# Patient Record
Sex: FEMALE | Race: WHITE | Employment: UNEMPLOYED | RURAL
[De-identification: names, ages, dates, MRNs, and addresses within clinical notes are randomized per-mention and may not be internally consistent; named-entity substitution may affect disease eponyms.]

---

## 2017-02-24 ENCOUNTER — OFFICE VISIT (OUTPATIENT)
Dept: FAMILY MEDICINE CLINIC | Age: 27
End: 2017-02-24

## 2017-02-24 VITALS
SYSTOLIC BLOOD PRESSURE: 110 MMHG | WEIGHT: 120 LBS | TEMPERATURE: 98.4 F | OXYGEN SATURATION: 96 % | RESPIRATION RATE: 16 BRPM | HEIGHT: 66 IN | DIASTOLIC BLOOD PRESSURE: 75 MMHG | BODY MASS INDEX: 19.29 KG/M2 | HEART RATE: 105 BPM

## 2017-02-24 DIAGNOSIS — N94.10 DYSPAREUNIA IN FEMALE: ICD-10-CM

## 2017-02-24 DIAGNOSIS — R10.33 ABDOMINAL CRAMPING, PERIUMBILICAL: ICD-10-CM

## 2017-02-24 DIAGNOSIS — M54.50 ACUTE BILATERAL LOW BACK PAIN WITHOUT SCIATICA: Primary | ICD-10-CM

## 2017-02-24 DIAGNOSIS — M62.830 MUSCLE SPASM OF BACK: ICD-10-CM

## 2017-02-24 LAB
BILIRUB UR QL STRIP: NEGATIVE
GLUCOSE UR-MCNC: NEGATIVE MG/DL
HCG URINE, QL. (POC): NEGATIVE
KETONES P FAST UR STRIP-MCNC: NEGATIVE MG/DL
PH UR STRIP: 6 [PH] (ref 4.6–8)
PROT UR QL STRIP: NORMAL MG/DL
SP GR UR STRIP: 1.01 (ref 1–1.03)
UA UROBILINOGEN AMB POC: NORMAL (ref 0.2–1)
URINALYSIS CLARITY POC: CLEAR
URINALYSIS COLOR POC: YELLOW
URINE BLOOD POC: NORMAL
URINE LEUKOCYTES POC: NORMAL
URINE NITRITES POC: NEGATIVE
VALID INTERNAL CONTROL?: YES

## 2017-02-24 RX ORDER — CYCLOBENZAPRINE HCL 10 MG
10 TABLET ORAL
Qty: 5 TAB | Refills: 0 | Status: SHIPPED | OUTPATIENT
Start: 2017-02-24 | End: 2017-03-01

## 2017-02-24 NOTE — MR AVS SNAPSHOT
Visit Information Date & Time Provider Department Dept. Phone Encounter #  
 2/24/2017  8:00 AM Salú Palmer MD 4 [facility] [phone] [id number] Follow-up Instructions Return in about 2 weeks (around 3/10/2017) for routine follow up . Upcoming Health Maintenance Date Due DTaP/Tdap/Td series (1 - Tdap) 6/7/2010 PAP AKA CERVICAL CYTOLOGY 6/7/2010 INFLUENZA AGE 9 TO ADULT 8/1/2016 Allergies as of 2/24/2017  Review Complete On: 2/24/2017 By: Oneil Junior LPN Severity Noted Reaction Type Reactions Bees [Hymenoptera Allergenic Extract]  05/19/2014    Swelling Current Immunizations  Never Reviewed No immunizations on file. Not reviewed this visit You Were Diagnosed With   
  
 Codes Comments Acute bilateral low back pain without sciatica    -  Primary ICD-10-CM: M54.5 ICD-9-CM: 724.2, 338.19 Abdominal cramping, periumbilical     NUD-92-EZ: R10.33 ICD-9-CM: 789.05   
 Muscle spasm of back     ICD-10-CM: M44.816 ICD-9-CM: 724.8 Dyspareunia in female     ICD-10-CM: N94.10 ICD-9-CM: 625.0 Vitals BP  
  
  
  
  
  
 110/75 (BP 1 Location: Left arm, BP Patient Position: Sitting) Vitals History BMI and BSA Data Body Mass Index Body Surface Area  
 19.67 kg/m 2 1.59 m 2 Preferred Pharmacy Pharmacy Name Phone [facility] [address] [place] [address] [phone] Your Updated Medication List  
  
   
This list is accurate as of: 2/24/17  9:24 AM.  Always use your most recent med list.  
  
  
  
  
 biotin 2,500 mcg Tab Take  by mouth. cyclobenzaprine 10 mg tablet Commonly known as:  FLEXERIL Take 1 Tab by mouth nightly for 5 days. Indications: MUSCLE SPASM  
  
 norgestimate-ethinyl estradiol 0.25-35 mg-mcg Tab Commonly known as:  [facility] (28) Take 1 Tab by mouth daily. Prescriptions Sent to Pharmacy Refills  
 cyclobenzaprine (FLEXERIL) 10 mg tablet 0 Sig: Take 1 Tab by mouth nightly for 5 days. Indications: MUSCLE SPASM Class: Normal  
 Pharmacy: [address] #: [phone] Route: Oral  
  
We Performed the Following AMB POC URINE PREGNANCY TEST, VISUAL COLOR COMPARISON [42631 CPT(R)] UA/M W/RFLX CULTURE, ROUTINE [[id number] Custom] Follow-up Instructions Return in about 2 weeks (around 3/10/2017) for routine follow up . To-Do List   
 02/28/2017 Imaging:  US PELV NON OBS  LTD   
  
 02/28/2017 Imaging:  US TRANSVAGINAL Patient Instructions A Healthy Lifestyle: Care Instructions Your Care Instructions A healthy lifestyle can help you feel good, stay at a healthy weight, and have plenty of energy for both work and play. A healthy lifestyle is something you can share with your whole family. A healthy lifestyle also can lower your risk for serious health problems, such as high blood pressure, heart disease, and diabetes. You can follow a few steps listed below to improve your health and the health of your family. Follow-up care is a key part of your treatment and safety. Be sure to make and go to all appointments, and call your doctor if you are having problems. Its also a good idea to know your test results and keep a list of the medicines you take. How can you care for yourself at home? · Do not eat too much sugar, fat, or fast foods. You can still have dessert and treats now and then. The goal is moderation. · Start small to improve your eating habits. Pay attention to portion sizes, drink less juice and soda pop, and eat more fruits and vegetables. ¨ Eat a healthy amount of food. A 3-ounce serving of meat, for example, is about the size of a deck of cards. Fill the rest of your plate with vegetables and whole grains. ¨ Limit the amount of soda and sports drinks you have every day. Drink more water when you are thirsty. ¨ Eat at least 5 servings of fruits and vegetables every day. It may seem like a lot, but it is not hard to reach this goal. A serving or helping is 1 piece of fruit, 1 cup of vegetables, or 2 cups of leafy, raw vegetables. Have an apple or some carrot sticks as an afternoon snack instead of a candy bar. Try to have fruits and/or vegetables at every meal. 
· Make exercise part of your daily routine. You may want to start with simple activities, such as walking, bicycling, or slow swimming. Try to be active 30 to 60 minutes every day. You do not need to do all 30 to 60 minutes all at once. For example, you can exercise 3 times a day for 10 or 20 minutes. Moderate exercise is safe for most people, but it is always a good idea to talk to your doctor before starting an exercise program. 
· Keep moving. Solange Folk the lawn, work in the garden, or Music Kickup. Take the stairs instead of the elevator at work. · If you smoke, quit. People who smoke have an increased risk for heart attack, stroke, cancer, and other lung illnesses. Quitting is hard, but there are ways to boost your chance of quitting tobacco for good. ¨ Use nicotine gum, patches, or lozenges. ¨ Ask your doctor about stop-smoking programs and medicines. ¨ Keep trying. In addition to reducing your risk of diseases in the future, you will notice some benefits soon after you stop using tobacco. If you have shortness of breath or asthma symptoms, they will likely get better within a few weeks after you quit. · Limit how much alcohol you drink. Moderate amounts of alcohol (up to 2 drinks a day for men, 1 drink a day for women) are okay. But drinking too much can lead to liver problems, high blood pressure, and other health problems. Family health If you have a family, there are many things you can do together to improve your health. · Eat meals together as a family as often as possible. · Eat healthy foods. This includes fruits, vegetables, lean meats and dairy, and whole grains. · Include your family in your fitness plan. Most people think of activities such as jogging or tennis as the way to fitness, but there are many ways you and your family can be more active. Anything that makes you breathe hard and gets your heart pumping is exercise. Here are some tips: 
¨ Walk to do errands or to take your child to school or the bus. ¨ Go for a family bike ride after dinner instead of watching TV. Where can you learn more? Go to http://vanceSoliohiren.info/. Enter L062 in the search box to learn more about \"A Healthy Lifestyle: Care Instructions. \" Current as of: July 26, 2016 Content Version: 11.1 © 1950-1540 Fios. Care instructions adapted under license by [employer] (which disclaims liability or warranty for this information). If you have questions about a medical condition or this instruction, always ask your healthcare professional. [address] any warranty or liability for your use of this information. Low Back Pain: Exercises Your Care Instructions Here are some examples of typical rehabilitation exercises for your condition. Start each exercise slowly. Ease off the exercise if you start to have pain. Your doctor or physical therapist will tell you when you can start these exercises and which ones will work best for you. How to do the exercises Press-up 1. Lie on your stomach, supporting your body with your forearms. 2. Press your elbows down into the floor to raise your upper back. As you do this, relax your stomach muscles and allow your back to arch without using your back muscles. As your press up, do not let your hips or pelvis come off the floor. 3. Hold for 15 to 30 seconds, then relax. 4. Repeat 2 to 4 times. Alternate arm and leg (bird dog) exercise Note: Do this exercise slowly. Try to keep your body straight at all times, and do not let one hip drop lower than the other. 1. Start on the floor, on your hands and knees. 2. Tighten your belly muscles. 3. Raise one leg off the floor, and hold it straight out behind you. Be careful not to let your hip drop down, because that will twist your trunk. 4. Hold for about 6 seconds, then lower your leg and switch to the other leg. 5. Repeat 8 to 12 times on each leg. 6. Over time, work up to holding for 10 to 30 seconds each time. 7. If you feel stable and secure with your leg raised, try raising the opposite arm straight out in front of you at the same time. Knee-to-chest exercise 1. Lie on your back with your knees bent and your feet flat on the floor. 2. Bring one knee to your chest, keeping the other foot flat on the floor (or keeping the other leg straight, whichever feels better on your lower back). 3. Keep your lower back pressed to the floor. Hold for at least 15 to 30 seconds. 4. Relax, and lower the knee to the starting position. 5. Repeat with the other leg. Repeat 2 to 4 times with each leg. 6. To get more stretch, put your other leg flat on the floor while pulling your knee to your chest. 
Curl-ups 1. Lie on the floor on your back with your knees bent at a 90-degree angle. Your feet should be flat on the floor, about 12 inches from your buttocks. 2. Cross your arms over your chest. If this bothers your neck, try putting your hands behind your neck (not your head), with your elbows spread apart. 3. Slowly tighten your belly muscles and raise your shoulder blades off the floor. 4. Keep your head in line with your body, and do not press your chin to your chest. 
5. Hold this position for 1 or 2 seconds, then slowly lower yourself back down to the floor. 6. Repeat 8 to 12 times. Pelvic tilt exercise 1. Lie on your back with your knees bent. 2. \"Brace\" your stomach. This means to tighten your muscles by pulling in and imagining your belly button moving toward your spine. You should feel like your back is pressing to the floor and your hips and pelvis are rocking back. 3. Hold for about 6 seconds while you breathe smoothly. 4. Repeat 8 to 12 times. Heel dig bridging 1. Lie on your back with both knees bent and your ankles bent so that only your heels are digging into the floor. Your knees should be bent about 90 degrees. 2. Then push your heels into the floor, squeeze your buttocks, and lift your hips off the floor until your shoulders, hips, and knees are all in a straight line. 3. Hold for about 6 seconds as you continue to breathe normally, and then slowly lower your hips back down to the floor and rest for up to 10 seconds. 4. Do 8 to 12 repetitions. Hamstring stretch in doorway 1. Lie on your back in a doorway, with one leg through the open door. 2. Slide your leg up the wall to straighten your knee. You should feel a gentle stretch down the back of your leg. 3. Hold the stretch for at least 15 to 30 seconds. Do not arch your back, point your toes, or bend either knee. Keep one heel touching the floor and the other heel touching the wall. 4. Repeat with your other leg. 5. Do 2 to 4 times for each leg. Hip flexor stretch 1. Kneel on the floor with one knee bent and one leg behind you. Place your forward knee over your foot. Keep your other knee touching the floor. 2. Slowly push your hips forward until you feel a stretch in the upper thigh of your rear leg. 3. Hold the stretch for at least 15 to 30 seconds. Repeat with your other leg. 4. Do 2 to 4 times on each side. Wall sit 1. Stand with your back 10 to 12 inches away from a wall. 2. Lean into the wall until your back is flat against it. 3. Slowly slide down until your knees are slightly bent, pressing your lower back into the wall. 4. Hold for about 6 seconds, then slide back up the wall. 5. Repeat 8 to 12 times. Follow-up care is a key part of your treatment and safety. Be sure to make and go to all appointments, and call your doctor if you are having problems. It's also a good idea to know your test results and keep a list of the medicines you take. Where can you learn more? Go to http://vance-hiren.info/. Enter R202 in the search box to learn more about \"Low Back Pain: Exercises. \" Current as of: May 23, 2016 Content Version: 11.1 © 9925-0791 Mybandstock. Care instructions adapted under license by [employer] (which disclaims liability or warranty for this information). If you have questions about a medical condition or this instruction, always ask your healthcare professional. [address] any warranty or liability for your use of this information. Pelvic Ultrasound for Women: About This Test 
What is it? A pelvic ultrasound test uses sound waves to make a picture of the inside of the lower belly (pelvis). It allows your doctor to see your bladder, cervix, uterus, fallopian tubes, and ovaries. The sound waves create a picture on a video monitor. The test can be done in two ways: · Transabdominal. A small handheld device (transducer) is passed back and forth over your lower belly. · Transvaginal. A thin, lubricated transducer is placed in your vagina. Why is this test done? A pelvic ultrasound test is done to: · Find the cause of urinary problems. · Find out what's causing pelvic pain. · Look for causes of vaginal bleeding and menstrual problems. · Check for growths or masses like ovarian cysts or uterine fibroids. · See if a fertilized egg is growing outside the uterus. This is called a tubal pregnancy. · Confirm the stage of a pregnancy and check the baby's heartbeat.  
How can you prepare for the test? 
 · If you are having a transabdominal ultrasound, your doctor will ask you to drink 4 to 6 glasses of juice or water about an hour before the test. This will fill your bladder. If you can't fill your bladder, it can be filled with water through a thin, flexible tube (catheter). · If you are having both transabdominal and transvaginal ultrasounds done, you'll start with a full bladder. You will be asked to empty it before the transvaginal ultrasound. What happens before the test? 
· You will need to remove any jewelry that might be in the way of the ultrasound. · You will need to take off most of your clothes below the waist. You'll be given a gown to wear during the test. 
What happens during the test? 
For a transabdominal ultrasound: 
· You lie down on your back on an exam table. · A warm gel will be spread on your lower belly. This improves the transmission of the sound waves. The handheld transducer is pressed against your belly and gently moved back and forth. A picture of the organs can be seen on a video monitor. For a transvaginal ultrasound: 
· You lie down on your back on an exam table with your hips slightly raised. · The tip of a thin, lubricated transducer probe is gently inserted into your vagina. The transducer may be moved around to get a complete view. The images from the test are shown on a video monitor. What else should you know about the test? 
· With a transabdominal ultrasound, you will feel light pressure from the transducer as it passes over your belly. If you have an injury or pelvic pain, the pressure may be painful. · With a transvaginal ultrasound, you may feel some discomfort from the transducer probe as it is put into your vagina. · You will not hear or feel the sound waves. How long does the test take? · The transabdominal ultrasound test will take about 30 minutes. · The transvaginal ultrasound test will take 15 to 30 minutes.  
What happens after the test? 
 · You will probably be able to go home right away. · You can go back to your usual activities right away. Follow-up care is a key part of your treatment and safety. Be sure to make and go to all appointments, and call your doctor if you are having problems. It's also a good idea to keep a list of the medicines you take. Ask your doctor when you can expect to have your test results. Where can you learn more? Go to http://vance-hiren.info/. Enter E112 in the search box to learn more about \"Pelvic Ultrasound for Women: About This Test.\" Current as of: February 25, 2016 Content Version: 11.1 © 9437-2782 Vendscreen. Care instructions adapted under license by [employer] (which disclaims liability or warranty for this information). If you have questions about a medical condition or this instruction, always ask your healthcare professional. [place] [address] any warranty or liability for your use of this information. Introducing [facility]! [address] introduces InfoGin patient portal. Now you can access parts of your medical record, email your doctor's office, and request medication refills online. 1. In your internet browser, go to [email or web address] 2. Click on the First Time User? Click Here link in the Sign In box. You will see the New Member Sign Up page. 3. Enter your InfoGin Access Code exactly as it appears below. You will not need to use this code after youve completed the sign-up process. If you do not sign up before the expiration date, you must request a new code. · InfoGin Access Code: [email or web address] Expires: 5/25/2017  9:24 AM 
 
4. Enter the last four digits of your Social Security Number (xxxx) and Date of Birth (mm/dd/yyyy) as indicated and click Submit. You will be taken to the next sign-up page. 5. Create a InfoGin ID.  This will be your InfoGin login ID and cannot be changed, so think of one that is secure and easy to remember. 6. Create a Viralize password. You can change your password at any time. 7. Enter your Password Reset Question and Answer. This can be used at a later time if you forget your password. 8. Enter your e-mail address. You will receive e-mail notification when new information is available in [address] 9. Click Sign Up. You can now view and download portions of your medical record. 10. Click the Download Summary menu link to download a portable copy of your medical information. If you have questions, please visit the Frequently Asked Questions section of the Viralize website. Remember, Viralize is NOT to be used for urgent needs. For medical emergencies, dial 911. Now available from your iPhone and Android! Please provide this summary of care documentation to your next provider. Your primary care clinician is listed as Brittany Bailey. If you have any questions after today's visit, please call [phone].

## 2017-02-24 NOTE — PROGRESS NOTES
Reviewed record in preparation for visit and have obtained necessary documentation. Patient did not bring medications to visit for review. Information provided on Advanced Directive, Living Will. Body mass index is 19.67 kg/(m^2).    Health Maintenance Due   Topic Date Due    DTaP/Tdap/Td series (1 - Tdap) 06/07/2010    PAP AKA CERVICAL CYTOLOGY  06/07/2010    INFLUENZA AGE 9 TO ADULT  08/01/2016

## 2017-02-24 NOTE — PATIENT INSTRUCTIONS
A Healthy Lifestyle: Care Instructions  Your Care Instructions  A healthy lifestyle can help you feel good, stay at a healthy weight, and have plenty of energy for both work and play. A healthy lifestyle is something you can share with your whole family. A healthy lifestyle also can lower your risk for serious health problems, such as high blood pressure, heart disease, and diabetes. You can follow a few steps listed below to improve your health and the health of your family. Follow-up care is a key part of your treatment and safety. Be sure to make and go to all appointments, and call your doctor if you are having problems. Its also a good idea to know your test results and keep a list of the medicines you take. How can you care for yourself at home? · Do not eat too much sugar, fat, or fast foods. You can still have dessert and treats now and then. The goal is moderation. · Start small to improve your eating habits. Pay attention to portion sizes, drink less juice and soda pop, and eat more fruits and vegetables. ¨ Eat a healthy amount of food. A 3-ounce serving of meat, for example, is about the size of a deck of cards. Fill the rest of your plate with vegetables and whole grains. ¨ Limit the amount of soda and sports drinks you have every day. Drink more water when you are thirsty. ¨ Eat at least 5 servings of fruits and vegetables every day. It may seem like a lot, but it is not hard to reach this goal. A serving or helping is 1 piece of fruit, 1 cup of vegetables, or 2 cups of leafy, raw vegetables. Have an apple or some carrot sticks as an afternoon snack instead of a candy bar. Try to have fruits and/or vegetables at every meal.  · Make exercise part of your daily routine. You may want to start with simple activities, such as walking, bicycling, or slow swimming. Try to be active 30 to 60 minutes every day. You do not need to do all 30 to 60 minutes all at once.  For example, you can exercise 3 times a day for 10 or 20 minutes. Moderate exercise is safe for most people, but it is always a good idea to talk to your doctor before starting an exercise program.  · Keep moving. Brittany Nelson the lawn, work in the garden, or Smart Vo[employer]. Take the stairs instead of the elevator at work. · If you smoke, quit. People who smoke have an increased risk for heart attack, stroke, cancer, and other lung illnesses. Quitting is hard, but there are ways to boost your chance of quitting tobacco for good. ¨ Use nicotine gum, patches, or lozenges. ¨ Ask your doctor about stop-smoking programs and medicines. ¨ Keep trying. In addition to reducing your risk of diseases in the future, you will notice some benefits soon after you stop using tobacco. If you have shortness of breath or asthma symptoms, they will likely get better within a few weeks after you quit. · Limit how much alcohol you drink. Moderate amounts of alcohol (up to 2 drinks a day for men, 1 drink a day for women) are okay. But drinking too much can lead to liver problems, high blood pressure, and other health problems. Family health  If you have a family, there are many things you can do together to improve your health. · Eat meals together as a family as often as possible. · Eat healthy foods. This includes fruits, vegetables, lean meats and dairy, and whole grains. · Include your family in your fitness plan. Most people think of activities such as jogging or tennis as the way to fitness, but there are many ways you and your family can be more active. Anything that makes you breathe hard and gets your heart pumping is exercise. Here are some tips:  ¨ Walk to do errands or to take your child to school or the bus. ¨ Go for a family bike ride after dinner instead of watching TV. Where can you learn more? Go to http://vance-hiren.info/. Enter R388 in the search box to learn more about \"A Healthy Lifestyle: Care Instructions. \"  Current as of: July 26, 2016  Content Version: 11.1  © 6520-9775 [employer]. Care instructions adapted under license by [employer] (which disclaims liability or warranty for this information). If you have questions about a medical condition or this instruction, always ask your healthcare professional. [address] any warranty or liability for your use of this information. Low Back Pain: Exercises  Your Care Instructions  Here are some examples of typical rehabilitation exercises for your condition. Start each exercise slowly. Ease off the exercise if you start to have pain. Your doctor or physical therapist will tell you when you can start these exercises and which ones will work best for you. How to do the exercises  Press-up    1. Lie on your stomach, supporting your body with your forearms. 2. Press your elbows down into the floor to raise your upper back. As you do this, relax your stomach muscles and allow your back to arch without using your back muscles. As your press up, do not let your hips or pelvis come off the floor. 3. Hold for 15 to 30 seconds, then relax. 4. Repeat 2 to 4 times. Alternate arm and leg (bird dog) exercise    Note: Do this exercise slowly. Try to keep your body straight at all times, and do not let one hip drop lower than the other. 1. Start on the floor, on your hands and knees. 2. Tighten your belly muscles. 3. Raise one leg off the floor, and hold it straight out behind you. Be careful not to let your hip drop down, because that will twist your trunk. 4. Hold for about 6 seconds, then lower your leg and switch to the other leg. 5. Repeat 8 to 12 times on each leg. 6. Over time, work up to holding for 10 to 30 seconds each time. 7. If you feel stable and secure with your leg raised, try raising the opposite arm straight out in front of you at the same time. Knee-to-chest exercise    1.  Lie on your back with your knees bent and your feet flat on the floor. 2. Bring one knee to your chest, keeping the other foot flat on the floor (or keeping the other leg straight, whichever feels better on your lower back). 3. Keep your lower back pressed to the floor. Hold for at least 15 to 30 seconds. 4. Relax, and lower the knee to the starting position. 5. Repeat with the other leg. Repeat 2 to 4 times with each leg. 6. To get more stretch, put your other leg flat on the floor while pulling your knee to your chest.  Curl-ups    1. Lie on the floor on your back with your knees bent at a 90-degree angle. Your feet should be flat on the floor, about 12 inches from your buttocks. 2. Cross your arms over your chest. If this bothers your neck, try putting your hands behind your neck (not your head), with your elbows spread apart. 3. Slowly tighten your belly muscles and raise your shoulder blades off the floor. 4. Keep your head in line with your body, and do not press your chin to your chest.  5. Hold this position for 1 or 2 seconds, then slowly lower yourself back down to the floor. 6. Repeat 8 to 12 times. Pelvic tilt exercise    1. Lie on your back with your knees bent. 2. \"Brace\" your stomach. This means to tighten your muscles by pulling in and imagining your belly button moving toward your spine. You should feel like your back is pressing to the floor and your hips and pelvis are rocking back. 3. Hold for about 6 seconds while you breathe smoothly. 4. Repeat 8 to 12 times. Heel dig bridging    1. Lie on your back with both knees bent and your ankles bent so that only your heels are digging into the floor. Your knees should be bent about 90 degrees. 2. Then push your heels into the floor, squeeze your buttocks, and lift your hips off the floor until your shoulders, hips, and knees are all in a straight line.   3. Hold for about 6 seconds as you continue to breathe normally, and then slowly lower your hips back down to the floor and rest for up to 10 seconds. 4. Do 8 to 12 repetitions. Hamstring stretch in doorway    1. Lie on your back in a doorway, with one leg through the open door. 2. Slide your leg up the wall to straighten your knee. You should feel a gentle stretch down the back of your leg. 3. Hold the stretch for at least 15 to 30 seconds. Do not arch your back, point your toes, or bend either knee. Keep one heel touching the floor and the other heel touching the wall. 4. Repeat with your other leg. 5. Do 2 to 4 times for each leg. Hip flexor stretch    1. Kneel on the floor with one knee bent and one leg behind you. Place your forward knee over your foot. Keep your other knee touching the floor. 2. Slowly push your hips forward until you feel a stretch in the upper thigh of your rear leg. 3. Hold the stretch for at least 15 to 30 seconds. Repeat with your other leg. 4. Do 2 to 4 times on each side. Wall sit    1. Stand with your back 10 to 12 inches away from a wall. 2. Lean into the wall until your back is flat against it. 3. Slowly slide down until your knees are slightly bent, pressing your lower back into the wall. 4. Hold for about 6 seconds, then slide back up the wall. 5. Repeat 8 to 12 times. Follow-up care is a key part of your treatment and safety. Be sure to make and go to all appointments, and call your doctor if you are having problems. It's also a good idea to know your test results and keep a list of the medicines you take. Where can you learn more? Go to http://vance-hiren.info/. Enter V164 in the search box to learn more about \"Low Back Pain: Exercises. \"  Current as of: May 23, 2016  Content Version: 11.1  © 8242-4374 [employer]. Care instructions adapted under license by [employer] (which disclaims liability or warranty for this information).  If you have questions about a medical condition or this instruction, always ask your healthcare professional. [employer], [facility] disclaims any warranty or liability for your use of this information. Pelvic Ultrasound for Women: About This Test  What is it? A pelvic ultrasound test uses sound waves to make a picture of the inside of the lower belly (pelvis). It allows your doctor to see your bladder, cervix, uterus, fallopian tubes, and ovaries. The sound waves create a picture on a video monitor. The test can be done in two ways:  · Transabdominal. A small handheld device (transducer) is passed back and forth over your lower belly. · Transvaginal. A thin, lubricated transducer is placed in your vagina. Why is this test done? A pelvic ultrasound test is done to:  · Find the cause of urinary problems. · Find out what's causing pelvic pain. · Look for causes of vaginal bleeding and menstrual problems. · Check for growths or masses like ovarian cysts or uterine fibroids. · See if a fertilized egg is growing outside the uterus. This is called a tubal pregnancy. · Confirm the stage of a pregnancy and check the baby's heartbeat. How can you prepare for the test?  · If you are having a transabdominal ultrasound, your doctor will ask you to drink 4 to 6 glasses of juice or water about an hour before the test. This will fill your bladder. If you can't fill your bladder, it can be filled with water through a thin, flexible tube (catheter). · If you are having both transabdominal and transvaginal ultrasounds done, you'll start with a full bladder. You will be asked to empty it before the transvaginal ultrasound. What happens before the test?  · You will need to remove any jewelry that might be in the way of the ultrasound. · You will need to take off most of your clothes below the waist. You'll be given a gown to wear during the test.  What happens during the test?  For a transabdominal ultrasound:  · You lie down on your back on an exam table. · A warm gel will be spread on your lower belly.  This improves the transmission of the sound waves. The handheld transducer is pressed against your belly and gently moved back and forth. A picture of the organs can be seen on a video monitor. For a transvaginal ultrasound:  · You lie down on your back on an exam table with your hips slightly raised. · The tip of a thin, lubricated transducer probe is gently inserted into your vagina. The transducer may be moved around to get a complete view. The images from the test are shown on a video monitor. What else should you know about the test?  · With a transabdominal ultrasound, you will feel light pressure from the transducer as it passes over your belly. If you have an injury or pelvic pain, the pressure may be painful. · With a transvaginal ultrasound, you may feel some discomfort from the transducer probe as it is put into your vagina. · You will not hear or feel the sound waves. How long does the test take? · The transabdominal ultrasound test will take about 30 minutes. · The transvaginal ultrasound test will take 15 to 30 minutes. What happens after the test?  · You will probably be able to go home right away. · You can go back to your usual activities right away. Follow-up care is a key part of your treatment and safety. Be sure to make and go to all appointments, and call your doctor if you are having problems. It's also a good idea to keep a list of the medicines you take. Ask your doctor when you can expect to have your test results. Where can you learn more? Go to http://vance-hiren.info/. Enter S181 in the search box to learn more about \"Pelvic Ultrasound for Women: About This Test.\"  Current as of: February 25, 2016  Content Version: 11.1  © 2206-4476 [employer]. Care instructions adapted under license by [employer] (which disclaims liability or warranty for this information).  If you have questions about a medical condition or this instruction, always ask your healthcare professional. [address] any warranty or liability for your use of this information.

## 2017-02-26 LAB
APPEARANCE UR: ABNORMAL
BACTERIA #/AREA URNS HPF: ABNORMAL /[HPF]
BACTERIA UR CULT: NORMAL
BILIRUB UR QL STRIP: NEGATIVE
CASTS URNS QL MICRO: ABNORMAL /LPF
COLOR UR: YELLOW
EPI CELLS #/AREA URNS HPF: ABNORMAL /HPF
GLUCOSE UR QL: NEGATIVE
HGB UR QL STRIP: ABNORMAL
KETONES UR QL STRIP: NEGATIVE
LEUKOCYTE ESTERASE UR QL STRIP: ABNORMAL
MICRO URNS: ABNORMAL
MUCOUS THREADS URNS QL MICRO: PRESENT
NITRITE UR QL STRIP: NEGATIVE
PH UR STRIP: 6 [PH] (ref 5–7.5)
PROT UR QL STRIP: ABNORMAL
RBC #/AREA URNS HPF: ABNORMAL /HPF
SP GR UR: 1.02 (ref 1–1.03)
URINALYSIS REFLEX, 377202: ABNORMAL
UROBILINOGEN UR STRIP-MCNC: 0.2 MG/DL (ref 0.2–1)
WBC #/AREA URNS HPF: ABNORMAL /HPF

## 2017-02-27 NOTE — PROGRESS NOTES
Clinic Note   Subjective:   Robyn Hill is a 31 y.o. female who is otherwise healthy   CC:Abdominal cramping, post coital bilateral side pain, lower back pain   History provided by patient     HPI:    Pt is a poor historian    Abdominal pain   Pt reports gerneralized  abdominal cramping likely associated with chronic constipation for the past two weeks. Pt reports abdominal pain has improved after having one large bowel movement yestersday. Pt takes over the counter stool softeners but reports not eating or drinking enough. Pt averages a small meal a day and does not drink water that often. Pt reports one episode of hematochezia ( bright red blood with stool ) 3-4 days ago, that has since resolved. Pt has taken Motrin with some relief. Pt states symptoms are intermittent without any exacerbating factors or association with food or eating. Additionally,pt reports sharp right side abdominal /back pain that has now transferred to the left side initially occurring during intercourse 2-3 weeks ago. . She states the pain is intermittent, rating 10/10 at its peak. She states pain only occurs during intercourse with associated internal vaginal pain. Pt states pain started within the past month, with last episode occurring a week ago. Pt reports never experiencing issues with intercourse prior to this month. She reports avoiding intercourse bc it is too painful. Pt reports no new sexual partners. Pt states she is wants to have intercourse with her  and had previously enjoyed it until these symptoms appeared. Denies vaginal dryness, abnormal vaginal bleeding, abnormal vaginal discharge, dysuria, urinary frequency or hematuria. Pt is unsure if abdominal pain or cramping is associated with her baseline abdominal cramps during menses along with constipation.  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 Last paps mear was last year with [place] [facility]. Denies history of abnormal papsmears. LMP 2 weeks ago. Reports monthly menses lasting 5-7 days with a normal flow. Averaging 1-2 pads daily. Does not use condoms,  However is compliant with Sprintec. Denies recent falls, recent injuries,  headaches, vision changes,fevers, chills, nausea, vomiting , chest pain, dyspnea, abdominal pain, lower extremity swelling. Current Outpatient Prescriptions on File Prior to Visit   Medication Sig Dispense Refill    norgestimate-ethinyl estradiol ([facility], ,) 0.25-35 mg-mcg per tablet Take 1 Tab by mouth daily. 1 Package 12    biotin 2,500 mcg tab Take  by mouth. No current facility-administered medications on file prior to visit. History reviewed. No pertinent past medical history. Social History     Social History    Marital status:      Spouse name: N/A    Number of children: N/A    Years of education: N/A     Occupational History    Not on file. Social History Main Topics    Smoking status: Never Smoker    Smokeless tobacco: Never Used    Alcohol use Yes      Comment: social    Drug use: No    Sexual activity: Yes     Partners: Male     Birth control/ protection: Pill     Other Topics Concern    Not on file     Social History Narrative       Review of Systems   Constitutional: Negative for chills, fever and malaise/fatigue. Respiratory: Negative for cough, hemoptysis and shortness of breath. Cardiovascular: Negative for chest pain, palpitations and claudication. Gastrointestinal: Positive for constipation and nausea. Negative for abdominal pain and vomiting. Genitourinary: Positive for flank pain. Negative for dysuria, frequency and hematuria. Musculoskeletal: Positive for back pain. Negative for falls, joint pain, myalgias and neck pain. Skin: Negative for itching and rash.    Neurological: Negative for dizziness, tingling, sensory change, focal weakness, weakness and headaches. Psychiatric/Behavioral: Negative for depression. Objective:     Visit Vitals    /75 (BP 1 Location: Left arm, BP Patient Position: Sitting)    Pulse (!) 105    Temp 98.4 °F (36.9 °C) (Oral)    Resp 16    Ht 5' 5.5\" (1.664 m)    Wt 120 lb (54.4 kg)    LMP 02/16/2017    SpO2 96%    BMI 19.67 kg/m2      Physical Exam:  Physical Exam   Constitutional: She is oriented to person, place, and time. She appears well-developed and well-nourished. HENT:   Head: Normocephalic and atraumatic. Eyes: Conjunctivae are normal. Pupils are equal, round, and reactive to light. Right eye exhibits no discharge. Left eye exhibits no discharge. No scleral icterus. Neck: Normal range of motion. Neck supple. Cardiovascular: Normal rate, regular rhythm, normal heart sounds and intact distal pulses. Exam reveals no gallop and no friction rub. No murmur heard. Pulmonary/Chest: Effort normal and breath sounds normal. No respiratory distress. She has no wheezes. She has no rales. She exhibits no tenderness. Abdominal: Soft. Normal appearance and bowel sounds are normal. She exhibits no mass. There is tenderness in the periumbilical area and suprapubic area. There is no rigidity, no rebound, no guarding, no CVA tenderness, no tenderness at McBurney's point and negative Otoole's sign. No hernia. Genitourinary: Rectal exam shows no external hemorrhoid. There is no tenderness or lesion on the right labia. There is no tenderness or lesion on the left labia. Cervix exhibits no motion tenderness, no discharge and no friability. Right adnexum displays no mass and no tenderness. Left adnexum displays tenderness. Left adnexum displays no mass. No tenderness or bleeding in the vagina. Musculoskeletal: Normal range of motion. She exhibits no edema or deformity. Lumbar back: She exhibits tenderness and spasm.    Lymphadenopathy:        Right: No inguinal adenopathy present. Left: No inguinal adenopathy present. Neurological: She is alert and oriented to person, place, and time. No cranial nerve deficit. Skin: Skin is warm and dry. No rash noted. No erythema. Nursing note and vitals reviewed. Assessment and orders:       ICD-10-CM ICD-9-CM    1. Acute bilateral low back pain without sciatica M54.5 724.2 UA/M W/RFLX CULTURE, ROUTINE     338.19 AMB POC URINE PREGNANCY TEST, VISUAL COLOR COMPARISON      cyclobenzaprine (FLEXERIL) 10 mg tablet      AMB POC URINALYSIS DIP STICK AUTO W/O MICRO      MICROSCOPIC EXAMINATION      URINE CULTURE, ROUTINE   2. Abdominal cramping, periumbilical J16.63 825.50 UA/M W/RFLX CULTURE, ROUTINE      AMB POC URINE PREGNANCY TEST, VISUAL COLOR COMPARISON   3. Muscle spasm of back M62.830 724.8 cyclobenzaprine (FLEXERIL) 10 mg tablet   4. Dyspareunia in female N94.10 625.0 US PELV NON OBS  LTD      US TRANSVAGINAL     Orders Placed This Encounter     PELV NON OBS  LTD     Standing Status:   Future     Standing Expiration Date:   3/24/2018     Order Specific Question:   Is Patient Pregnant? Answer:   No    US TRANSVAGINAL     Standing Status:   Future     Standing Expiration Date:   3/24/2018     Order Specific Question:   Is Patient Pregnant? Answer:   No    UA/M W/RFLX CULTURE, ROUTINE    MICROSCOPIC EXAMINATION    URINE CULTURE, ROUTINE    AMB POC URINE PREGNANCY TEST, VISUAL COLOR COMPARISON    AMB POC URINALYSIS DIP STICK AUTO W/O MICRO    cyclobenzaprine (FLEXERIL) 10 mg tablet     Sig: Take 1 Tab by mouth nightly for 5 days. Indications: MUSCLE SPASM     Dispense:  5 Tab     Refill:  0     Sharon Puls was seen today for abdominal pain. Diagnoses and all orders for this visit:    Acute bilateral low back pain without sciatica  -     UA/M W/RFLX CULTURE, ROUTINE  -     AMB POC URINE PREGNANCY TEST, VISUAL COLOR COMPARISON  -     cyclobenzaprine (FLEXERIL) 10 mg tablet;  Take 1 Tab by mouth nightly for 5 days. Indications: MUSCLE SPASM  -     AMB POC URINALYSIS DIP STICK AUTO W/O MICRO  -     MICROSCOPIC EXAMINATION  -     URINE CULTURE, ROUTINE    Abdominal cramping, periumbilical  Possibly related to chronic constipation, however unsure given paitient could not express sequence of events. Sx could also be related to menses,Mittelschmerz, dyspareunia UTI. Candance Huger UA and pregnancy test was negative  -     UA/M W/RFLX CULTURE, ROUTINE  -     AMB POC URINE PREGNANCY TEST, VISUAL COLOR COMPARISON  -     Advised patient to drink more water, eat high fiber foods such as oatmeal as well as eat 3 balanced meals daily    Muscle spasm of back  -     cyclobenzaprine (FLEXERIL) 10 mg tablet; Take 1 Tab by mouth nightly for 5 days. Indications: MUSCLE SPASM    Dyspareunia in female   Pt is a poor historian with initially complaining of dyspareunia,back pain, abdominal pain, without remembering the sequence of events. Pelvic exam was normal with left sided adnexal tenderness. BHcg negative,Urine Cx showing mixed urogenital kris. Will further assess with U/S to rule out possible ovarian cyst  -     US PELV NON OBS  LTD; Future  -     US TRANSVAGINAL; Future      Follow-up Disposition:  Return in about 2 weeks (around 3/10/2017) for routine follow up . I have reviewed patient medical and social history and medications. I have reviewed pertinent labs results and other data. I have discussed the diagnosis with the patient and the intended plan as seen in the above orders. The patient has received an after-visit summary and questions were answered concerning future plans. I have discussed medication side effects and warnings with the patient as well.     Kaitlin Pedraza MD  Resident [facility]  02/27/17

## 2017-02-28 NOTE — PROGRESS NOTES
I reviewed with the resident the medical history and the resident's findings on the physical examination. I discussed with the resident the patient's diagnosis and concur with the plan. Pt is poor historian. Will get pelvic US to further evaluate given lack of reliable history and symptoms worsening with sex. Back pain likely muscle sprain given history and exam but could also be related to abdominal process.

## 2017-03-03 ENCOUNTER — TELEPHONE (OUTPATIENT)
Dept: FAMILY MEDICINE CLINIC | Age: 27
End: 2017-03-03

## 2017-03-03 NOTE — TELEPHONE ENCOUNTER
Need Nurse to call back. She has questions about the US. She wanted to know what it was for. She thinks she sort of missed everything after she said \"not pregnant. ([id number]. Just need to know what it is for. Does not need to wait to hear from her. The nurse will be fine.  Thanks

## 2017-03-10 ENCOUNTER — OFFICE VISIT (OUTPATIENT)
Dept: FAMILY MEDICINE CLINIC | Age: 27
End: 2017-03-10

## 2017-03-10 VITALS
DIASTOLIC BLOOD PRESSURE: 71 MMHG | TEMPERATURE: 98 F | BODY MASS INDEX: 19.29 KG/M2 | HEART RATE: 103 BPM | HEIGHT: 66 IN | RESPIRATION RATE: 14 BRPM | WEIGHT: 120 LBS | OXYGEN SATURATION: 99 % | SYSTOLIC BLOOD PRESSURE: 110 MMHG

## 2017-03-10 NOTE — PROGRESS NOTES
Progress Note    Patient: Flores Barajas MRN: [id number]  SSN: [id number]    YOB: 1990  Age: 31 y.o. Sex: female        Chief Complaint   Patient presents with    Back Pain         Subjective:     No diagnosis found. Tobacco Use {Blank Single Select Template:20061::\"yes\",\"no\"}  The patient {Blank Single Select Template:20061::\"does not use tobacco products. \",\"does use tobacco products. \"} {If yes, qmtobacocessation}      BMI  Discussed the patient's {MU BMI REASON:915417919} BMI with her. I have recommended the following interventions: {MU High/Low BMI Interventions:732841502}. The BMI follow up plan is as follows: {Document your plan here:20552}          Current and past medical information:    Current Medications after this visit[de-identified]   Current Outpatient Prescriptions   Medication Sig    biotin 2,500 mcg tab Take  by mouth.  norgestimate-ethinyl estradiol (SPRINTEC, 28,) 0.25-35 mg-mcg per tablet Take 1 Tab by mouth daily. No current facility-administered medications for this visit. There are no active problems to display for this patient. History reviewed. No pertinent past medical history. Allergies   Allergen Reactions    Bees [Hymenoptera Allergenic Extract] Swelling       History reviewed. No pertinent surgical history.     Social History     Social History    Marital status:      Spouse name: N/A    Number of children: N/A    Years of education: N/A     Social History Main Topics    Smoking status: Never Smoker    Smokeless tobacco: Never Used    Alcohol use Yes      Comment: social    Drug use: No    Sexual activity: Yes     Partners: Male     Birth control/ protection: Pill     Other Topics Concern    None     Social History Narrative       ROS ***    Objective:     Vitals:    03/10/17 0854   BP: 110/71   Pulse: (!) 103   Resp: 14   Temp: 98 °F (36.7 °C)   TempSrc: Oral   SpO2: 99%   Weight: 120 lb (54.4 kg)   Height: 5' 5.5\" (1.664 m)      Body mass index is 19.67 kg/(m^2). Physical Exam ***    Health Maintenance Due   Topic Date Due    DTaP/Tdap/Td series (1 - Tdap) 06/07/2010    PAP AKA CERVICAL CYTOLOGY  06/07/2010    INFLUENZA AGE 9 TO ADULT  08/01/2016       Assessment and orders:   No diagnosis found. There are no diagnoses linked to this encounter. Plan of care:  Discussed diagnoses in detail with patient. Medication risks/benefits/side effects discussed with patient. All of the patient's questions were addressed. The patient understands and agrees with our plan of care. The patient knows to call back if they are unsure of or forget any changes we discussed today or if the symptoms change. The patient received an After-Visit Summary which contains VS, orders, medication list and allergy list. This can be used as a \"mini-medical record\" should they have to seek medical care while out of town.     Follow-up Disposition: Not on File    Future Appointments  Date Time Provider Garrett Marte   3/10/2017 9:30 AM Ricardo Nguyen MD [facility] SCHED   3/17/2017 12:30 PM [facility] [address]   3/17/2017 1:00 PM [facility] [address] [facility]       Signed By: Ricardo Nguyen MD     March 10, 2017

## 2017-03-10 NOTE — PROGRESS NOTES
Reviewed record in preparation for visit and have necessary documentation      Body mass index is 19.67 kg/(m^2).     Health Maintenance Due   Topic Date Due    DTaP/Tdap/Td series (1 - Tdap) 06/07/2010    PAP AKA CERVICAL CYTOLOGY  06/07/2010    INFLUENZA AGE 9 TO ADULT  08/01/2016

## 2017-03-17 ENCOUNTER — HOSPITAL ENCOUNTER (OUTPATIENT)
Dept: ULTRASOUND IMAGING | Age: 27
Discharge: HOME | End: 2017-03-17
Attending: FAMILY MEDICINE
Payer: COMMERCIAL

## 2017-03-17 DIAGNOSIS — N94.10 DYSPAREUNIA IN FEMALE: ICD-10-CM

## 2017-03-17 PROCEDURE — 76856 US EXAM PELVIC COMPLETE: CPT

## 2017-03-17 PROCEDURE — 76830 TRANSVAGINAL US NON-OB: CPT

## 2017-03-21 ENCOUNTER — TELEPHONE (OUTPATIENT)
Dept: FAMILY MEDICINE CLINIC | Age: 27
End: 2017-03-21

## 2017-03-27 ENCOUNTER — TELEPHONE (OUTPATIENT)
Dept: FAMILY MEDICINE CLINIC | Age: 27
End: 2017-03-27

## 2017-09-15 ENCOUNTER — OFFICE VISIT (OUTPATIENT)
Dept: FAMILY MEDICINE CLINIC | Age: 27
End: 2017-09-15

## 2017-09-15 VITALS
DIASTOLIC BLOOD PRESSURE: 87 MMHG | TEMPERATURE: 99.2 F | HEIGHT: 66 IN | RESPIRATION RATE: 18 BRPM | OXYGEN SATURATION: 95 % | BODY MASS INDEX: 19.93 KG/M2 | WEIGHT: 124 LBS | HEART RATE: 105 BPM | SYSTOLIC BLOOD PRESSURE: 123 MMHG

## 2017-09-15 DIAGNOSIS — J06.9 VIRAL UPPER RESPIRATORY TRACT INFECTION: ICD-10-CM

## 2017-09-15 DIAGNOSIS — H60.392 OTHER INFECTIVE OTITIS EXTERNA OF LEFT EAR, UNSPECIFIED CHRONICITY: Primary | ICD-10-CM

## 2017-09-15 RX ORDER — RANITIDINE 150 MG/1
150 CAPSULE ORAL 2 TIMES DAILY
Qty: 14 CAP | Refills: 0 | Status: SHIPPED | OUTPATIENT
Start: 2017-09-15 | End: 2019-05-20

## 2017-09-15 RX ORDER — BENZONATATE 100 MG/1
100 CAPSULE ORAL
Qty: 20 CAP | Refills: 0 | Status: SHIPPED | OUTPATIENT
Start: 2017-09-15 | End: 2017-09-22

## 2017-09-15 RX ORDER — GUAIFENESIN 600 MG/1
600 TABLET, EXTENDED RELEASE ORAL 2 TIMES DAILY
Qty: 14 TAB | Refills: 0
Start: 2017-09-15 | End: 2019-01-09

## 2017-09-15 RX ORDER — OFLOXACIN 3 MG/ML
5 SOLUTION AURICULAR (OTIC) DAILY
Qty: 5 ML | Refills: 0 | Status: SHIPPED | OUTPATIENT
Start: 2017-09-15 | End: 2019-01-09

## 2017-09-15 NOTE — PROGRESS NOTES
Progress Note    Patient: Aleisha Salter MRN: [id number]  SSN: [id number]    YOB: 1990  Age: 28 y.o. Sex: female        Chief Complaint   Patient presents with    Cough    Cold Symptoms     runny nose, stuffy nose         Subjective:     Encounter Diagnoses   Name Primary?  Other infective otitis externa of left ear, unspecified chronicity Yes    Viral upper respiratory tract infection        Cold Symptoms  Patient reports cold symptoms since Monday? . Reports that she went to [employer], itchy throat beginning on Monday then started to have minimally cough with white/yellow. and \"burning in chest.\" Has tried DayQuil and NightQuil for symptoms. Denies dysuria, sore throat. Rash. Current and past medical information:    Current Medications after this visit[de-identified]    Current Outpatient Prescriptions   Medication Sig    ofloxacin (FLOXIN) 0.3 % otic solution Administer 5 Drops in left ear daily.  raNITIdine hcl 150 mg capsule Take 150 mg by mouth two (2) times a day.  benzonatate (TESSALON) 100 mg capsule Take 1 Cap by mouth three (3) times daily as needed for Cough for up to 7 days.  guaiFENesin ER (MUCINEX) 600 mg ER tablet Take 1 Tab by mouth two (2) times a day.  norgestimate-ethinyl estradiol (SPRINTEC, 28,) 0.25-35 mg-mcg per tablet Take 1 Tab by mouth daily.  biotin 2,500 mcg tab Take  by mouth. No current facility-administered medications for this visit. There are no active problems to display for this patient. History reviewed. No pertinent past medical history. Allergies   Allergen Reactions    Bees [Hymenoptera Allergenic Extract] Swelling       History reviewed. No pertinent surgical history.     Social History     Social History    Marital status:      Spouse name: N/A    Number of children: N/A    Years of education: N/A     Social History Main Topics    Smoking status: Never Smoker    Smokeless tobacco: Never Used    Alcohol use Yes Comment: social    Drug use: No    Sexual activity: Yes     Partners: Male     Birth control/ protection: Pill     Other Topics Concern    None     Social History Narrative       {Review of Systems   Constitutional: Negative for chills and fever. HENT: Positive for congestion. Negative for ear discharge, ear pain, sinus pain and sore throat. Eyes: Negative for pain, discharge and redness. Respiratory: Positive for cough and sputum production. Negative for hemoptysis, shortness of breath and wheezing. Cardiovascular: Positive for chest pain. Negative for palpitations. Gastrointestinal: Negative for abdominal pain, blood in stool, constipation, diarrhea and vomiting. Genitourinary: Negative for dysuria, frequency, hematuria and urgency. Objective:     Vitals:    09/15/17 1403   BP: 123/87   Pulse: (!) 105   Resp: 18   Temp: 99.2 °F (37.3 °C)   TempSrc: Oral   SpO2: 95%   Weight: 124 lb (56.2 kg)   Height: 5' 5.5\" (1.664 m)      Body mass index is 20.32 kg/(m^2). Physical Exam   Constitutional: She is oriented to person, place, and time. She appears well-developed and well-nourished. HENT:   Right Ear: Tympanic membrane, external ear and ear canal normal.   Left Ear: Tympanic membrane normal. No lacerations. There is drainage and tenderness. No foreign bodies. Tympanic membrane is not erythematous and not bulging. No middle ear effusion. Mouth/Throat: Oropharynx is clear and moist. No oropharyngeal exudate. Eyes: EOM are normal. Pupils are equal, round, and reactive to light. Neck: Normal range of motion. Neck supple. No thyromegaly present. Cardiovascular: Normal rate and regular rhythm. Exam reveals no friction rub. No murmur heard. Pulmonary/Chest: Effort normal and breath sounds normal. No respiratory distress. She has no wheezes. Abdominal: Soft. Bowel sounds are normal. She exhibits no distension and no mass. There is no tenderness.    Lymphadenopathy:     She has no cervical adenopathy. Neurological: She is alert and oriented to person, place, and time. Health Maintenance Due   Topic Date Due    DTaP/Tdap/Td series (1 - Tdap) 06/07/2010    PAP AKA CERVICAL CYTOLOGY  06/07/2010    INFLUENZA AGE 9 TO ADULT  08/01/2017       Assessment and orders:     Encounter Diagnoses     ICD-10-CM ICD-9-CM   1. Other infective otitis externa of left ear, unspecified chronicity H60.392 380.10   2. Viral upper respiratory tract infection J06.9 465.9    B97.89        1. Other infective otitis externa of left ear, unspecified chronicity  - ofloxacin (FLOXIN) 0.3 % otic solution; Administer 5 Drops in left ear daily. Dispense: 5 mL; Refill: 0    2. Viral upper respiratory tract infection  Advised symptomatic treatment. - raNITIdine hcl 150 mg capsule; Take 150 mg by mouth two (2) times a day. Dispense: 14 Cap; Refill: 0  - benzonatate (TESSALON) 100 mg capsule; Take 1 Cap by mouth three (3) times daily as needed for Cough for up to 7 days. Dispense: 20 Cap; Refill: 0  - guaiFENesin ER (MUCINEX) 600 mg ER tablet; Take 1 Tab by mouth two (2) times a day. Dispense: 14 Tab; Refill: 0        Plan of care:  Discussed diagnoses in detail with patient. Medication risks/benefits/side effects discussed with patient. All of the patient's questions were addressed. The patient understands and agrees with our plan of care. The patient knows to call back if they are unsure of or forget any changes we discussed today or if the symptoms change. The patient received an After-Visit Summary which contains VS, orders, medication list and allergy list. This can be used as a \"mini-medical record\" should they have to seek medical care while out of town. Follow-up Disposition:  Return if symptoms worsen or fail to improve within 2 weeks. No future appointments.     Signed By: Shannon Hadley MD     September 15, 2017

## 2017-09-15 NOTE — PROGRESS NOTES
Reviewed record in preparation for visit and have necessary documentation  Pt did not bring medication to office visit for review  Information was given to pt on Advanced Directives, Living Will  Information was given on Shingles Vaccine  Opportunity was given for questions  Goals that were addressed and/or need to be completed after this appointment include   Health Maintenance Due   Topic Date Due    DTaP/Tdap/Td series (1 - Tdap) 06/07/2010    PAP AKA CERVICAL CYTOLOGY  06/07/2010    INFLUENZA AGE 9 TO ADULT  08/01/2017

## 2017-09-15 NOTE — PATIENT INSTRUCTIONS
Swimmer's Ear: Care Instructions  Your Care Instructions    Swimmer's ear (otitis externa) is inflammation or infection of the ear canal. This is the passage that leads from the outer ear to the eardrum. Any water, sand, or other debris that gets into the ear canal and stays there can cause swimmer's ear. Putting cotton swabs or other items in the ear to clean it can also cause this problem. Swimmer's ear can be very painful. But you can treat the pain and infection with medicines. You should feel better in a few days. Follow-up care is a key part of your treatment and safety. Be sure to make and go to all appointments, and call your doctor if you are having problems. It's also a good idea to know your test results and keep a list of the medicines you take. How can you care for yourself at home? Cleaning and care  · Use antibiotic drops as your doctor directs. · Do not insert ear drops (other than the antibiotic ear drops) or anything else into the ear unless your doctor has told you to. · Avoid getting water in the ear until the problem clears up. Use cotton lightly coated with petroleum jelly as an earplug. Do not use plastic earplugs. · Use a hair dryer set on low to carefully dry the ear after you shower. · To ease ear pain, hold a warm washcloth against your ear. · Take pain medicines exactly as directed. ¨ If the doctor gave you a prescription medicine for pain, take it as prescribed. ¨ If you are not taking a prescription pain medicine, ask your doctor if you can take an over-the-counter medicine. Inserting ear drops  · Warm the drops to body temperature by rolling the container in your hands. Or you can place it in a cup of warm water for a few minutes. · Lie down, with your ear facing up. · Place drops inside the ear. Follow your doctor's instructions (or the directions on the label) for how many drops to use.  Gently wiggle the outer ear or pull the ear up and back to help the drops get into the ear. · It's important to keep the liquid in the ear canal for 3 to 5 minutes. When should you call for help? Call your doctor now or seek immediate medical care if:  · You have a new or higher fever. · You have new or worse pain, swelling, warmth, or redness around or behind your ear. · You have new or increasing pus or blood draining from your ear. Watch closely for changes in your health, and be sure to contact your doctor if:  · You are not getting better after 2 days (48 hours). Where can you learn more? Go to http://vance-hiren.info/. Enter C706 in the search box to learn more about \"Swimmer's Ear: Care Instructions. \"  Current as of: July 29, 2016  Content Version: 11.3  © 7946-4347 [employer]. Care instructions adapted under license by [employer] (which disclaims liability or warranty for this information). If you have questions about a medical condition or this instruction, always ask your healthcare professional. [place] [address] any warranty or liability for your use of this information. Upper Respiratory Infection (Cold): Care Instructions  Your Care Instructions    An upper respiratory infection, or URI, is an infection of the nose, sinuses, or throat. URIs are spread by coughs, sneezes, and direct contact. The common cold is the most frequent kind of URI. The flu and sinus infections are other kinds of URIs. Almost all URIs are caused by viruses. Antibiotics won't cure them. But you can treat most infections with home care. This may include drinking lots of fluids and taking over-the-counter pain medicine. You will probably feel better in 4 to 10 days. The doctor has checked you carefully, but problems can develop later. If you notice any problems or new symptoms, get medical treatment right away. Follow-up care is a key part of your treatment and safety.  Be sure to make and go to all appointments, and call your doctor if you are having problems. It's also a good idea to know your test results and keep a list of the medicines you take. How can you care for yourself at home? · To prevent dehydration, drink plenty of fluids, enough so that your urine is light yellow or clear like water. Choose water and other caffeine-free clear liquids until you feel better. If you have kidney, heart, or liver disease and have to limit fluids, talk with your doctor before you increase the amount of fluids you drink. · Take an over-the-counter pain medicine, such as acetaminophen (Tylenol), ibuprofen (Advil, Motrin), or naproxen (Aleve). Read and follow all instructions on the label. · Before you use cough and cold medicines, check the label. These medicines may not be safe for young children or for people with certain health problems. · Be careful when taking over-the-counter cold or flu medicines and Tylenol at the same time. Many of these medicines have acetaminophen, which is Tylenol. Read the labels to make sure that you are not taking more than the recommended dose. Too much acetaminophen (Tylenol) can be harmful. · Get plenty of rest.  · Do not smoke or allow others to smoke around you. If you need help quitting, talk to your doctor about stop-smoking programs and medicines. These can increase your chances of quitting for good. When should you call for help? Call 911 anytime you think you may need emergency care. For example, call if:  · You have severe trouble breathing. Call your doctor now or seek immediate medical care if:  · You seem to be getting much sicker. · You have new or worse trouble breathing. · You have a new or higher fever. · You have a new rash. Watch closely for changes in your health, and be sure to contact your doctor if:  · You have a new symptom, such as a sore throat, an earache, or sinus pain.   · You cough more deeply or more often, especially if you notice more mucus or a change in the color of your mucus. · You do not get better as expected. Where can you learn more? Go to http://vance-hiren.info/. Enter I655 in the search box to learn more about \"Upper Respiratory Infection (Cold): Care Instructions. \"  Current as of: March 25, 2017  Content Version: 11.3  © 3618-0160 [employer]. Care instructions adapted under license by [employer] (which disclaims liability or warranty for this information). If you have questions about a medical condition or this instruction, always ask your healthcare professional. [place] [address] any warranty or liability for your use of this information.

## 2017-09-15 NOTE — MR AVS SNAPSHOT
Visit Information Date & Time Provider Department Dept. Phone Encounter #  
 9/15/2017  2:10 PM Masha Garner MD  [facility] [id number] Follow-up Instructions Return if symptoms worsen or fail to improve within 2 weeks. Upcoming Health Maintenance Date Due DTaP/Tdap/Td series (1 - Tdap) 6/7/2010 PAP AKA CERVICAL CYTOLOGY 6/7/2010 INFLUENZA AGE 9 TO ADULT 8/1/2017 Allergies as of 9/15/2017  Review Complete On: 9/15/2017 By: Masha Garner MD  
  
 Severity Noted Reaction Type Reactions Bees [Hymenoptera Allergenic Extract]  05/19/2014    Swelling Current Immunizations  Never Reviewed No immunizations on file. Not reviewed this visit You Were Diagnosed With   
  
 Codes Comments Other infective otitis externa of left ear, unspecified chronicity    -  Primary ICD-10-CM: K47.550 ICD-9-CM: 380.10 Viral upper respiratory tract infection     ICD-10-CM: J06.9, B97.89 ICD-9-CM: 465.9 Vitals BP Pulse Temp Resp Height(growth percentile) Weight(growth percentile) 123/87 (!) 105 99.2 °F (37.3 °C) (Oral) 18 5' 5.5\" (1.664 m) 124 lb (56.2 kg) SpO2 BMI OB Status Smoking Status 95% 20.32 kg/m2 Having regular periods Never Smoker Vitals History BMI and BSA Data Body Mass Index Body Surface Area  
 20.32 kg/m 2 1.61 m 2 Preferred Pharmacy Pharmacy Name Phone [facility] [address] [place] [address] [phone] Your Updated Medication List  
  
   
This list is accurate as of: 9/15/17  2:31 PM.  Always use your most recent med list.  
  
  
  
  
 benzonatate 100 mg capsule Commonly known as:  TESSALON Take 1 Cap by mouth three (3) times daily as needed for Cough for up to 7 days. biotin 2,500 mcg Tab Take  by mouth.  
  
 guaiFENesin  mg ER tablet Commonly known as:  Alvaro & Alvaro Take 1 Tab by mouth two (2) times a day. norgestimate-ethinyl estradiol 0.25-35 mg-mcg Tab Commonly known as:  [facility] (28) Take 1 Tab by mouth daily. ofloxacin 0.3 % otic solution Commonly known as:  FLOXIN Administer 5 Drops in left ear daily. raNITIdine hcl 150 mg capsule Take 150 mg by mouth two (2) times a day. Prescriptions Sent to Pharmacy Refills  
 ofloxacin (FLOXIN) 0.3 % otic solution 0 Sig: Administer 5 Drops in left ear daily. Class: Normal  
 Pharmacy: [id number] [place] [address] Ph #: [phone] Route: Left Ear  
 raNITIdine hcl 150 mg capsule 0 Sig: Take 150 mg by mouth two (2) times a day. Class: Normal  
 Pharmacy: [id number] [place] [address] Ph #: [phone] Route: Oral  
 benzonatate (TESSALON) 100 mg capsule 0 Sig: Take 1 Cap by mouth three (3) times daily as needed for Cough for up to 7 days. Class: Normal  
 Pharmacy: [id number] [place] [address] Ph #: [phone] Route: Oral  
  
Follow-up Instructions Return if symptoms worsen or fail to improve within 2 weeks. Patient Instructions Swimmer's Ear: Care Instructions Your Care Instructions Swimmer's ear (otitis externa) is inflammation or infection of the ear canal. This is the passage that leads from the outer ear to the eardrum. Any water, sand, or other debris that gets into the ear canal and stays there can cause swimmer's ear. Putting cotton swabs or other items in the ear to clean it can also cause this problem. Swimmer's ear can be very painful. But you can treat the pain and infection with medicines. You should feel better in a few days. Follow-up care is a key part of your treatment and safety.  Be sure to make and go to all appointments, and call your doctor if you are having problems. It's also a good idea to know your test results and keep a list of the medicines you take. How can you care for yourself at home? Cleaning and care · Use antibiotic drops as your doctor directs. · Do not insert ear drops (other than the antibiotic ear drops) or anything else into the ear unless your doctor has told you to. · Avoid getting water in the ear until the problem clears up. Use cotton lightly coated with petroleum jelly as an earplug. Do not use plastic earplugs. · Use a hair dryer set on low to carefully dry the ear after you shower. · To ease ear pain, hold a warm washcloth against your ear. · Take pain medicines exactly as directed. ¨ If the doctor gave you a prescription medicine for pain, take it as prescribed. ¨ If you are not taking a prescription pain medicine, ask your doctor if you can take an over-the-counter medicine. Inserting ear drops · Warm the drops to body temperature by rolling the container in your hands. Or you can place it in a cup of warm water for a few minutes. · Lie down, with your ear facing up. · Place drops inside the ear. Follow your doctor's instructions (or the directions on the label) for how many drops to use. Gently wiggle the outer ear or pull the ear up and back to help the drops get into the ear. · It's important to keep the liquid in the ear canal for 3 to 5 minutes. When should you call for help? Call your doctor now or seek immediate medical care if: 
· You have a new or higher fever. · You have new or worse pain, swelling, warmth, or redness around or behind your ear. · You have new or increasing pus or blood draining from your ear. Watch closely for changes in your health, and be sure to contact your doctor if: 
· You are not getting better after 2 days (48 hours). Where can you learn more? Go to http://vance-hiren.info/. Enter C706 in the search box to learn more about \"Swimmer's Ear: Care Instructions. \" 
 Current as of: July 29, 2016 Content Version: 11.3 © 1387-8389 [employer]. Care instructions adapted under license by [employer] (which disclaims liability or warranty for this information). If you have questions about a medical condition or this instruction, always ask your healthcare professional. [address] any warranty or liability for your use of this information. Upper Respiratory Infection (Cold): Care Instructions Your Care Instructions An upper respiratory infection, or URI, is an infection of the nose, sinuses, or throat. URIs are spread by coughs, sneezes, and direct contact. The common cold is the most frequent kind of URI. The flu and sinus infections are other kinds of URIs. Almost all URIs are caused by viruses. Antibiotics won't cure them. But you can treat most infections with home care. This may include drinking lots of fluids and taking over-the-counter pain medicine. You will probably feel better in 4 to 10 days. The doctor has checked you carefully, but problems can develop later. If you notice any problems or new symptoms, get medical treatment right away. Follow-up care is a key part of your treatment and safety. Be sure to make and go to all appointments, and call your doctor if you are having problems. It's also a good idea to know your test results and keep a list of the medicines you take. How can you care for yourself at home? · To prevent dehydration, drink plenty of fluids, enough so that your urine is light yellow or clear like water. Choose water and other caffeine-free clear liquids until you feel better. If you have kidney, heart, or liver disease and have to limit fluids, talk with your doctor before you increase the amount of fluids you drink. · Take an over-the-counter pain medicine, such as acetaminophen (Tylenol), ibuprofen (Advil, Motrin), or naproxen (Aleve). Read and follow all instructions on the label. · Before you use cough and cold medicines, check the label. These medicines may not be safe for young children or for people with certain health problems. · Be careful when taking over-the-counter cold or flu medicines and Tylenol at the same time. Many of these medicines have acetaminophen, which is Tylenol. Read the labels to make sure that you are not taking more than the recommended dose. Too much acetaminophen (Tylenol) can be harmful. · Get plenty of rest. 
· Do not smoke or allow others to smoke around you. If you need help quitting, talk to your doctor about stop-smoking programs and medicines. These can increase your chances of quitting for good. When should you call for help? Call 911 anytime you think you may need emergency care. For example, call if: 
· You have severe trouble breathing. Call your doctor now or seek immediate medical care if: 
· You seem to be getting much sicker. · You have new or worse trouble breathing. · You have a new or higher fever. · You have a new rash. Watch closely for changes in your health, and be sure to contact your doctor if: 
· You have a new symptom, such as a sore throat, an earache, or sinus pain. · You cough more deeply or more often, especially if you notice more mucus or a change in the color of your mucus. · You do not get better as expected. Where can you learn more? Go to http://vance-hiren.info/. Enter Y087 in the search box to learn more about \"Upper Respiratory Infection (Cold): Care Instructions. \" Current as of: March 25, 2017 Content Version: 11.3 © 1770-6857 [employer]. Care instructions adapted under license by [employer] (which disclaims liability or warranty for this information). If you have questions about a medical condition or this instruction, always ask your healthcare professional. [address] any warranty or liability for your use of this information. Introducing [facility]! [employer] introduces Asktourism patient portal. Now you can access parts of your medical record, email your doctor's office, and request medication refills online. 1. In your internet browser, go to [email or web address] 2. Click on the First Time User? Click Here link in the Sign In box. You will see the New Member Sign Up page. 3. Enter your Asktourism Access Code exactly as it appears below. You will not need to use this code after youve completed the sign-up process. If you do not sign up before the expiration date, you must request a new code. · Asktourism Access Code: [email or web address] Expires: 12/14/2017  2:31 PM 
 
4. Enter the last four digits of your Social Security Number (xxxx) and Date of Birth (mm/dd/yyyy) as indicated and click Submit. You will be taken to the next sign-up page. 5. Create a Asktourism ID. This will be your Asktourism login ID and cannot be changed, so think of one that is secure and easy to remember. 6. Create a Asktourism password. You can change your password at any time. 7. Enter your Password Reset Question and Answer. This can be used at a later time if you forget your password. 8. Enter your e-mail address. You will receive e-mail notification when new information is available in [address] 9. Click Sign Up. You can now view and download portions of your medical record. 10. Click the Download Summary menu link to download a portable copy of your medical information. If you have questions, please visit the Frequently Asked Questions section of the Asktourism website. Remember, Asktourism is NOT to be used for urgent needs. For medical emergencies, dial 911. Now available from your iPhone and Android! Please provide this summary of care documentation to your next provider. Your primary care clinician is listed as Jose Alexis. If you have any questions after today's visit, please call [phone].

## 2019-01-09 ENCOUNTER — OFFICE VISIT (OUTPATIENT)
Dept: FAMILY MEDICINE CLINIC | Age: 29
End: 2019-01-09

## 2019-01-09 VITALS
HEIGHT: 66 IN | TEMPERATURE: 98.6 F | HEART RATE: 103 BPM | WEIGHT: 141 LBS | SYSTOLIC BLOOD PRESSURE: 127 MMHG | DIASTOLIC BLOOD PRESSURE: 78 MMHG | BODY MASS INDEX: 22.66 KG/M2 | RESPIRATION RATE: 16 BRPM | OXYGEN SATURATION: 97 %

## 2019-01-09 DIAGNOSIS — L30.9 DERMATITIS: ICD-10-CM

## 2019-01-09 DIAGNOSIS — J06.0 ACUTE LARYNGOPHARYNGITIS: Primary | ICD-10-CM

## 2019-01-09 NOTE — PROGRESS NOTES
1. Have you been to the ER, urgent care clinic, or been hospitalized since your last visit? No     2. Have you seen or consulted any other health care providers outside of the [address] [place] [address] since your last visit?    No     Reviewed record in preparation for visit and have necessary documentation  Opportunity was given for questions  Goals that were addressed and/or need to be completed during or after this appointment include     Health Maintenance Due   Topic Date Due    DTaP/Tdap/Td series (1 - Tdap) 06/07/2010    PAP AKA CERVICAL CYTOLOGY  06/07/2010    Influenza Age 9 to Adult  08/01/2018

## 2019-01-09 NOTE — PROGRESS NOTES
Kristie Vegas  33 y.o. female  1990  [place]  [id number]  [id number]     [id number]: Progress Note       Encounter Date: 1/9/2019    Chief Complaint   Patient presents with    Cold Symptoms     cough, sore throat, congestion, runny nose x4 days    Rash     to left arm       History provided by patient  History of Present Illness   Kristie Vegas is a 33 y.o. female who presents to clinic today for:    Cold Symptoms  Patient present with cc of cold symptoms x 4 days. Symptoms include cough, sore throat, and nasal congestion. Fever 103F (almost?) yesterday. +sick contacts (). This morning throat pain is much worse. Had taken mucinex for symptoms but next day rash appear on left wrist. Rash associated with itching. Does wear bracelet on wrist usually but removed it when rash appeared. Patient treated with hydrocortisone ad it is improving. Review of Systems   Review of Systems   Constitutional: Positive for chills and fever. HENT: Positive for congestion, ear pain and sore throat. Negative for ear discharge and sinus pain. Respiratory: Positive for cough and sputum production. Negative for shortness of breath and wheezing. Cardiovascular: Negative for chest pain and palpitations. Skin: Positive for itching and rash. Neurological: Positive for dizziness and headaches. Vitals/Objective:     Vitals:    01/09/19 0824   BP: 127/78   Pulse: (!) 103   Resp: 16   Temp: 98.6 °F (37 °C)   TempSrc: Oral   SpO2: 97%   Weight: 141 lb (64 kg)   Height: 5' 5.5\" (1.664 m)     Body mass index is 23.11 kg/m². Wt Readings from Last 3 Encounters:   01/09/19 141 lb (64 kg)   09/15/17 124 lb (56.2 kg)   03/10/17 120 lb (54.4 kg)       Physical Exam   Constitutional: She is oriented to person, place, and time. No distress. HENT:   Head: Normocephalic.    Right Ear: External ear normal.   Left Ear: External ear normal.   Eyes: EOM are normal. Pupils are equal, round, and reactive to light. No scleral icterus. Neck: Normal range of motion. Cardiovascular: Normal rate, regular rhythm and normal heart sounds. No murmur heard. Pulmonary/Chest: Effort normal and breath sounds normal. No respiratory distress. She has no wheezes. Musculoskeletal: She exhibits no edema. Lymphadenopathy:     She has no cervical adenopathy. Neurological: She is alert and oriented to person, place, and time. Skin: Skin is warm and dry. Rash noted. Rash is urticarial. She is not diaphoretic. Psychiatric: She has a normal mood and affect. No results found for this or any previous visit (from the past 24 hour(s)). Assessment and Plan:     Encounter Diagnoses     ICD-10-CM ICD-9-CM   1. Acute laryngopharyngitis J06.0 465.0   2. Dermatitis L30.9 692.9       1. Acute laryngopharyngitis  Likely viral. Outside window of Tamiflu for flu; will not test at this time.   - Brompheniram-PSE-Chlophedianol (ATUSS DA) 2-30-12.5 mg/5 mL liqd; Take 10 mL by mouth every eight (8) hours as needed. Do NOT eat or drink for 5 minutes after taking. Dispense: 1 Bottle; Refill: 0    2. Dermatitis  Allergic v contact. Advised patient not to wear any bracelets until rash has resolved. Continue hydrocortisone. I have discussed the diagnosis with the patient and the intended plan as seen in the above orders. she has expressed understanding. The patient has received an after-visit summary and questions were answered concerning future plans. I have discussed medication side effects and warnings with the patient as well. Electronically Signed: Lucas Leung MD     History/Allergies   Patients past medical, surgical and family histories were reviewed and updated. History reviewed. No pertinent past medical history. History reviewed. No pertinent surgical history.   Family History   Problem Relation Age of Onset    Cancer Maternal Aunt     Diabetes Maternal Grandmother     Diabetes Maternal Grandfather     Heart Disease Maternal Grandfather     Stroke Paternal Grandmother      Social History     Socioeconomic History    Marital status:      Spouse name: Not on file    Number of children: Not on file    Years of education: Not on file    Highest education level: Not on file   Social Needs    Financial resource strain: Not on file    Food insecurity - worry: Not on file    Food insecurity - inability: Not on file   [employer] needs - medical: Not on file   [employer] needs - non-medical: Not on file   Occupational History    Not on file   Tobacco Use    Smoking status: Never Smoker    Smokeless tobacco: Never Used   Substance and Sexual Activity    Alcohol use: Yes     Comment: social    Drug use: No    Sexual activity: Yes     Partners: Male     Birth control/protection: Pill   Other Topics Concern    Not on file   Social History Narrative    Not on file         Allergies   Allergen Reactions    Bees [Hymenoptera Allergenic Extract] Swelling       Disposition     Follow-up Disposition:  Return if symptoms worsen or fail to improve. No future appointments. Current Medications after this visit     Current Outpatient Medications   Medication Sig    Brompheniram-PSE-Chlophedianol (ATUSS DA) 2-30-12.5 mg/5 mL liqd Take 10 mL by mouth every eight (8) hours as needed. Do NOT eat or drink for 5 minutes after taking.  raNITIdine hcl 150 mg capsule Take 150 mg by mouth two (2) times a day.  norgestimate-ethinyl estradiol (SPRINTEC, 28,) 0.25-35 mg-mcg per tablet Take 1 Tab by mouth daily. No current facility-administered medications for this visit.       Medications Discontinued During This Encounter   Medication Reason    ofloxacin (FLOXIN) 0.3 % otic solution Not A Current Medication    guaiFENesin ER (MUCINEX) 600 mg ER tablet Not A Current Medication    biotin 2,500 mcg tab Not A Current Medication

## 2019-01-09 NOTE — PATIENT INSTRUCTIONS
Sore Throat: Care Instructions  Your Care Instructions    Infection by bacteria or a virus causes most sore throats. Cigarette smoke, dry air, air pollution, allergies, and yelling can also cause a sore throat. Sore throats can be painful and annoying. Fortunately, most sore throats go away on their own. If you have a bacterial infection, your doctor may prescribe antibiotics. Follow-up care is a key part of your treatment and safety. Be sure to make and go to all appointments, and call your doctor if you are having problems. It's also a good idea to know your test results and keep a list of the medicines you take. How can you care for yourself at home? · If your doctor prescribed antibiotics, take them as directed. Do not stop taking them just because you feel better. You need to take the full course of antibiotics. · Gargle with warm salt water once an hour to help reduce swelling and relieve discomfort. Use 1 teaspoon of salt mixed in 1 cup of warm water. · Take an over-the-counter pain medicine, such as acetaminophen (Tylenol), ibuprofen (Advil, Motrin), or naproxen (Aleve). Read and follow all instructions on the label. · Be careful when taking over-the-counter cold or flu medicines and Tylenol at the same time. Many of these medicines have acetaminophen, which is Tylenol. Read the labels to make sure that you are not taking more than the recommended dose. Too much acetaminophen (Tylenol) can be harmful. · Drink plenty of fluids. Fluids may help soothe an irritated throat. Hot fluids, such as tea or soup, may help decrease throat pain. · Use over-the-counter throat lozenges to soothe pain. Regular cough drops or hard candy may also help. These should not be given to young children because of the risk of choking. · Do not smoke or allow others to smoke around you. If you need help quitting, talk to your doctor about stop-smoking programs and medicines.  These can increase your chances of quitting for good. · Use a vaporizer or humidifier to add moisture to your bedroom. Follow the directions for cleaning the machine. When should you call for help? Call your doctor now or seek immediate medical care if:    · You have new or worse trouble swallowing.     · Your sore throat gets much worse on one side.    Watch closely for changes in your health, and be sure to contact your doctor if you do not get better as expected. Where can you learn more? Go to http://vance-hiren.info/. Enter 062 441 80 19 in the search box to learn more about \"Sore Throat: Care Instructions. \"  Current as of: March 28, 2018  Content Version: 11.8  © 6778-7603 [employer]. Care instructions adapted under license by [employer] (which disclaims liability or warranty for this information). If you have questions about a medical condition or this instruction, always ask your healthcare professional. [address] any warranty or liability for your use of this information.

## 2019-05-20 ENCOUNTER — OFFICE VISIT (OUTPATIENT)
Dept: FAMILY MEDICINE CLINIC | Age: 29
End: 2019-05-20

## 2019-05-20 VITALS
RESPIRATION RATE: 16 BRPM | WEIGHT: 139 LBS | HEIGHT: 66 IN | OXYGEN SATURATION: 98 % | HEART RATE: 106 BPM | BODY MASS INDEX: 22.34 KG/M2 | SYSTOLIC BLOOD PRESSURE: 129 MMHG | DIASTOLIC BLOOD PRESSURE: 82 MMHG | TEMPERATURE: 98.8 F

## 2019-05-20 DIAGNOSIS — M54.50 ACUTE BILATERAL LOW BACK PAIN WITHOUT SCIATICA: Primary | ICD-10-CM

## 2019-05-20 LAB
BILIRUB UR QL STRIP: NEGATIVE
GLUCOSE UR-MCNC: NEGATIVE MG/DL
HCG URINE, QL. (POC): NEGATIVE
KETONES P FAST UR STRIP-MCNC: NEGATIVE MG/DL
PH UR STRIP: 6 [PH] (ref 4.6–8)
PROT UR QL STRIP: NEGATIVE
SP GR UR STRIP: 1.02 (ref 1–1.03)
UA UROBILINOGEN AMB POC: NORMAL (ref 0.2–1)
URINALYSIS CLARITY POC: CLEAR
URINALYSIS COLOR POC: YELLOW
URINE BLOOD POC: NEGATIVE
URINE LEUKOCYTES POC: NORMAL
URINE NITRITES POC: NEGATIVE
VALID INTERNAL CONTROL?: YES

## 2019-05-20 RX ORDER — CYCLOBENZAPRINE HCL 5 MG
5 TABLET ORAL
Qty: 20 TAB | Refills: 0 | Status: SHIPPED | OUTPATIENT
Start: 2019-05-20 | End: 2022-05-20

## 2019-05-20 NOTE — PATIENT INSTRUCTIONS
Back Care and Preventing Injuries: Care Instructions Your Care Instructions You can hurt your back doing many everyday activities: lifting a heavy box, bending down to garden, exercising at the gym, and even getting out of bed. But you can keep your back strong and healthy by doing some exercises. You also can follow a few tips for sitting, sleeping, and lifting to avoid hurting your back again. Talk to your doctor before you start an exercise program. Ask for help if you want to learn more about keeping your back healthy. Follow-up care is a key part of your treatment and safety. Be sure to make and go to all appointments, and call your doctor if you are having problems. It's also a good idea to know your test results and keep a list of the medicines you take. How can you care for yourself at home? · Stay at a healthy weight to avoid strain on your lower back. · Do not smoke. Smoking increases the risk of osteoporosis, which weakens the spine. If you need help quitting, talk to your doctor about stop-smoking programs and medicines. These can increase your chances of quitting for good. · Make sure you sleep in a position that maintains your back's normal curves and on a mattress that feels comfortable. Sleep on your side with a pillow between your knees, or sleep on your back with a pillow under your knees. These positions can reduce strain on your back. · When you get out of bed, lie on your side and bend both knees. Drop your feet over the edge of the bed as you push up with both arms. Scoot to the edge of the bed. Make sure your feet are in line with your rear end (buttocks), and then stand up. · If you must stand for a long time, put one foot on a stool, ledge, or box. Exercise to strengthen your back and other muscles · Get at least 30 minutes of exercise on most days of the week. Walking is a good choice.  You also may want to do other activities, such as running, swimming, cycling, or playing tennis or team sports. · Stretch your back muscles. Here are few exercises to try: 
? Lie on your back with your knees bent and your feet flat on the floor. Gently pull one bent knee to your chest. Put that foot back on the floor, and then pull the other knee to your chest. Hold for 15 to 30 seconds. Repeat 2 to 4 times. ? Do pelvic tilts. Lie on your back with your knees bent. Tighten your stomach muscles. Pull your belly button (navel) in and up toward your ribs. You should feel like your back is pressing to the floor and your hips and pelvis are slightly lifting off the floor. Hold for 6 seconds while breathing smoothly. · Keep your core muscles strong. The muscles of your back, belly (abdomen), and buttocks support your spine. ? Pull in your belly, and imagine pulling your navel toward your spine. Hold this for 6 seconds, then relax. Remember to keep breathing normally as you tense your muscles. ? Do curl-ups. Always do them with your knees bent. Keep your low back on the floor, and curl your shoulders toward your knees using a smooth, slow motion. Keep your arms folded across your chest. If this bothers your neck, try putting your hands behind your neck (not your head), with your elbows spread apart. ? Lie on your back with your knees bent and your feet flat on the floor. Tighten your belly muscles, and then push with your feet and raise your buttocks up a few inches. Hold this position 6 seconds as you continue to breathe normally, then lower yourself slowly to the floor. Repeat 8 to 12 times. ? If you like group exercise, try Pilates or yoga. These classes have poses that strengthen the core muscles. Protect your back when you sit · Place a small pillow, a rolled-up towel, or a lumbar roll in the curve of your back if you need extra support.  
· Sit in a chair that is low enough to let you place both feet flat on the floor with both knees nearly level with your hips. If your chair or desk is too high, use a foot rest to raise your knees. · When driving, keep your knees nearly level with your hips. Sit straight, and drive with both hands on the steering wheel. Your arms should be in a slightly bent position. · Try a kneeling chair, which helps tilt your hips forward. This takes pressure off your lower back. · Try sitting on an exercise ball. It can rock from side to side, which helps keep your back loose. Lift properly · Squat down, bending at the hips and knees only. If you need to, put one knee to the floor and extend your other knee in front of you, bent at a right angle (half kneeling). · Press your chest straight forward. This helps keep your upper back straight while keeping a slight arch in your low back. · Hold the load as close to your body as possible, at the level of your navel. · Use your feet to change direction, taking small steps. · Lead with your hips as you change direction. Keep your shoulders in line with your hips as you move. Do not twist your body. · Set down your load carefully, squatting with your knees and hips only. When should you call for help? Watch closely for changes in your health, and be sure to contact your doctor if you have any problems. Where can you learn more? Go to http://vance-hiren.info/. Enter S810 in the search box to learn more about \"Back Care and Preventing Injuries: Care Instructions. \" Current as of: September 20, 2018 Content Version: 11.9 © 8678-0980 [employer]. Care instructions adapted under license by [employer] (which disclaims liability or warranty for this information). If you have questions about a medical condition or this instruction, always ask your healthcare professional. Margret Jeffrey any warranty or liability for your use of this information. Getting Back to Normal After Low Back Pain: Care Instructions Your Care Instructions Almost everyone has low back pain at some time. The good news is that most low back pain will go away in a few days or weeks with some basic self-care. Some people are afraid that doing too much may make their pain worse. In the past, people stayed in bed, thinking this would help their backs. Now doctors think that, in most cases, getting back to your normal activities is good for your back, as long as you avoid doing things that make your pain worse. Follow-up care is a key part of your treatment and safety. Be sure to make and go to all appointments, and call your doctor if you are having problems. It's also a good idea to know your test results and keep a list of the medicines you take. How can you care for yourself at home? Ease back into daily activities · For the first day or two of pain, take it easy. But as soon as possible, get back to your normal daily life and activities. · Get gentle exercise, such as walking. Movement keeps your spine flexible and helps your muscles stay strong. · If you are an athlete, return to your activity carefully. Choose a low-impact option until your pain is under control. Avoid or change activities that cause pain · Try to avoid too much bending, heavy lifting, or reaching. These movements put extra stress on your back. · In bed, try lying on your side with a pillow between your knees. Or lie on your back on the floor with a pillow under your knees. · When you sit, place a small pillow, a rolled-up towel, or a lumbar roll in the curve of your back for extra support. · Try putting one foot up on a stool or changing positions every few minutes if you have to stand still for a period of time. Pay attention to body mechanics and posture Body mechanics are the way you use your body. Posture is the way you sit or stand. · Take extra care when you lift. When you must lift, bend your knees and keep your back straight. Avoid twisting, and keep the load close to your body. · Stand or sit tall, with your shoulders back and your stomach pulled in to support your back. Get support when you need it · Let people know when you need a helping hand. Get family members or friends to help out with tasks you cannot do right now. · Be honest with your doctor about how the pain affects you. · If you have had to take time off work, talk to your doctor and boss about a gradual [id number] plan. Find out if there are other ways you could do your job to avoid hurting your back again. Reduce stress Worrying about the pain can cause you to tense the muscles in your lower back. This in turn causes more pain. Here are a few things you can do to relax your mind and your muscles: · Take 10 to 15 minutes to sit quietly and breathe deeply. Try to focus only on your breathing. If you cannot keep thoughts away, think about things that make you feel good. · Get involved in your favorite hobby, or try something new. · Talk to a friend, read a book, or listen to your favorite music. · Find a counselor you like and trust. Talk openly and honestly about your problems. Be willing to make some changes. When should you call for help? Call 911 anytime you think you may need emergency care. For example, call if: 
  · You are unable to move a leg at all.  
[facility] your doctor now or seek immediate medical care if: 
  · You have new or worse symptoms in your legs, belly, or buttocks. Symptoms may include: 
? Numbness or tingling. ? Weakness. ? Pain.  
  · You lose bladder or bowel control.  
 Watch closely for changes in your health, and be sure to contact your doctor if: 
  · You have a fever, lose weight, or don't feel well.  
  · You are not getting better as expected. Where can you learn more? Go to http://mark.info/. Enter G699 in the search box to learn more about \"Getting Back to Normal After Low Back Pain: Care Instructions. \" Current as of: September 20, 2018 Content Version: 11.9 © 9445-6264 [employer]. Care instructions adapted under license by [employer] (which disclaims liability or warranty for this information). If you have questions about a medical condition or this instruction, always ask your healthcare professional. [place] [address] any warranty or liability for your use of this information.

## 2019-05-20 NOTE — PROGRESS NOTES
1. Have you been to the ER, urgent care clinic, or been hospitalized since your last visit? No  
 
2. Have you seen or consulted any other health care providers outside of the [address] [place] [address] since your last visit? No  
 
 
Reviewed record in preparation for visit and have necessary documentation 
opportunity was given for questions Goals that were addressed and/or need to be completed during or after this appointment include Health Maintenance Due Topic Date Due  
 DTaP/Tdap/Td series (1 - Tdap) 06/07/2010  PAP AKA CERVICAL CYTOLOGY  06/07/2010

## 2019-10-14 ENCOUNTER — OFFICE VISIT (OUTPATIENT)
Dept: FAMILY MEDICINE CLINIC | Age: 29
End: 2019-10-14

## 2019-10-14 VITALS
TEMPERATURE: 99.2 F | WEIGHT: 140 LBS | SYSTOLIC BLOOD PRESSURE: 112 MMHG | HEIGHT: 66 IN | RESPIRATION RATE: 18 BRPM | HEART RATE: 106 BPM | BODY MASS INDEX: 22.5 KG/M2 | OXYGEN SATURATION: 96 % | DIASTOLIC BLOOD PRESSURE: 78 MMHG

## 2019-10-14 DIAGNOSIS — J10.1 INFLUENZA B: Primary | ICD-10-CM

## 2019-10-14 DIAGNOSIS — J02.9 SORE THROAT: ICD-10-CM

## 2019-10-14 DIAGNOSIS — I95.9 HYPOTENSION, UNSPECIFIED HYPOTENSION TYPE: ICD-10-CM

## 2019-10-14 DIAGNOSIS — R55 NEAR SYNCOPE: ICD-10-CM

## 2019-10-14 LAB
GLUCOSE POC: 120 MG/DL
QUICKVUE INFLUENZA TEST: POSITIVE
S PYO AG THROAT QL: NEGATIVE
VALID INTERNAL CONTROL?: YES
VALID INTERNAL CONTROL?: YES

## 2019-10-14 RX ORDER — OSELTAMIVIR PHOSPHATE 75 MG/1
75 CAPSULE ORAL 2 TIMES DAILY
Qty: 10 CAP | Refills: 0 | Status: SHIPPED | OUTPATIENT
Start: 2019-10-14 | End: 2019-10-19

## 2019-10-14 RX ORDER — AZITHROMYCIN 250 MG/1
TABLET, FILM COATED ORAL
Qty: 6 TAB | Refills: 0 | Status: SHIPPED | OUTPATIENT
Start: 2019-10-14 | End: 2019-10-14

## 2019-10-14 NOTE — PROGRESS NOTES
1. Have you been to the ER, urgent care clinic since your last visit? Hospitalized since your last visit? No    2. Have you seen or consulted any other health care providers outside of the [address] [place] [address] since your last visit? Include any pap smears or colon screening.  No  Reviewed record in preparation for visit and have necessary documentation  Pt did not bring medication to office visit for review  opportunity was given for questions  Goals that were addressed and/or need to be completed during or after this appointment include    Health Maintenance Due   Topic Date Due    DTaP/Tdap/Td series (1 - Tdap) 06/07/2010    PAP AKA CERVICAL CYTOLOGY  06/07/2010    Influenza Age 9 to Adult  08/01/2019

## 2019-10-18 NOTE — PATIENT INSTRUCTIONS
Influenza (Flu): Care Instructions  Your Care Instructions    Influenza (flu) is an infection in the lungs and breathing passages. It is caused by the influenza virus. There are different strains, or types, of the flu virus from year to year. Unlike the common cold, the flu comes on suddenly and the symptoms, such as a cough, congestion, fever, chills, fatigue, aches, and pains, are more severe. These symptoms may last up to 10 days. Although the flu can make you feel very sick, it usually doesn't cause serious health problems. Home treatment is usually all you need for flu symptoms. But your doctor may prescribe antiviral medicine to prevent other health problems, such as pneumonia, from developing. Older people and those who have a long-term health condition, such as lung disease, are most at risk for having pneumonia or other health problems. Follow-up care is a key part of your treatment and safety. Be sure to make and go to all appointments, and call your doctor if you are having problems. It's also a good idea to know your test results and keep a list of the medicines you take. How can you care for yourself at home? · Get plenty of rest.  · Drink plenty of fluids, enough so that your urine is light yellow or clear like water. If you have kidney, heart, or liver disease and have to limit fluids, talk with your doctor before you increase the amount of fluids you drink. · Take an over-the-counter pain medicine if needed, such as acetaminophen (Tylenol), ibuprofen (Advil, Motrin), or naproxen (Aleve), to relieve fever, headache, and muscle aches. Read and follow all instructions on the label. No one younger than 20 should take aspirin. It has been linked to Reye syndrome, a serious illness. · Do not smoke. Smoking can make the flu worse. If you need help quitting, talk to your doctor about stop-smoking programs and medicines. These can increase your chances of quitting for good.   · Breathe moist air from a hot shower or from a sink filled with hot water to help clear a stuffy nose. · Before you use cough and cold medicines, check the label. These medicines may not be safe for young children or for people with certain health problems. · If the skin around your nose and lips becomes sore, put some petroleum jelly on the area. · To ease coughing:  ? Drink fluids to soothe a scratchy throat. ? Suck on cough drops or plain hard candy. ? Take an over-the-counter cough medicine that contains dextromethorphan to help you get some sleep. Read and follow all instructions on the label. ? Raise your head at night with an extra pillow. This may help you rest if coughing keeps you awake. · Take any prescribed medicine exactly as directed. Call your doctor if you think you are having a problem with your medicine. To avoid spreading the flu  · Wash your hands regularly, and keep your hands away from your face. · Stay home from school, work, and other public places until you are feeling better and your fever has been gone for at least 24 hours. The fever needs to have gone away on its own without the help of medicine. · Ask people living with you to talk to their doctors about preventing the flu. They may get antiviral medicine to keep from getting the flu from you. · To prevent the flu in the future, get a flu vaccine every fall. Encourage people living with you to get the vaccine. · Cover your mouth when you cough or sneeze. When should you call for help? Call 911 anytime you think you may need emergency care.  For example, call if:    · You have severe trouble breathing.    Call your doctor now or seek immediate medical care if:    · You have new or worse trouble breathing.     · You seem to be getting much sicker.     · You feel very sleepy or confused.     · You have a new or higher fever.     · You get a new rash.    Watch closely for changes in your health, and be sure to contact your doctor if:    · You begin to get better and then get worse.     · You are not getting better after 1 week. Where can you learn more? Go to http://vance-hiren.info/. Enter W153 in the search box to learn more about \"Influenza (Flu): Care Instructions. \"  Current as of: June 9, 2019  Content Version: 12.2  © 6101-1919 [employer]. Care instructions adapted under license by [employer] ([employer]) (which disclaims liability or warranty for this information). If you have questions about a medical condition or this instruction, always ask your healthcare professional. [place] [address] any warranty or liability for your use of this information.

## 2019-10-18 NOTE — PROGRESS NOTES
Patient: Viky Morgan MRN: [id number]  SSN: [id number]    YOB: 1990  Age: 26 y.o. Sex: female      Chief Complaint   Patient presents with   Napoleon Escalona is a 26 y.o. female presents with complaints of sore throat, dry cough, myalgias and malaise for 1 days. There has been no nausea and no vomiting . she has not had  swollen glands and headache. Patient is drinking plenty of fluids. There is not a hx of asthma. There is not a hx of allergic rhinitis. There is not a hx of tobacco use. Patient with near syncope during encounter. Patient put in left lateral decubitus on exam table, then hydrated with water. Mother-in-law in waiting for transport to home. Medications:     Current Outpatient Medications   Medication Sig    Brompheniram-PSE-Chlophedianol (ATUSS DA) 2-30-12.5 mg/5 mL liqd Take 10 mL by mouth every eight (8) hours as needed for Cough. Do NOT eat or drink for 5 minutes after taking.  oseltamivir (TAMIFLU) 75 mg capsule Take 1 Cap by mouth two (2) times a day for 5 days.  norgestimate-ethinyl estradiol (SPRINTEC, 28,) 0.25-35 mg-mcg per tablet Take 1 Tab by mouth daily.  cyclobenzaprine (FLEXERIL) 5 mg tablet Take 1 Tab by mouth two (2) times daily as needed for Muscle Spasm(s). No current facility-administered medications for this visit. Problem List:   There are no active problems to display for this patient. Medical History:   No past medical history on file. Allergies: Allergies   Allergen Reactions    Bees [Hymenoptera Allergenic Extract] Swelling       Surgical History:   No past surgical history on file.     Social History:     Social History     Socioeconomic History    Marital status:      Spouse name: Not on file    Number of children: Not on file    Years of education: Not on file    Highest education level: Not on file   Tobacco Use    Smoking status: Never Smoker    Smokeless tobacco: Never Used   Substance and Sexual Activity    Alcohol use: Yes     Comment: social    Drug use: No    Sexual activity: Yes     Partners: Male     Birth control/protection: Pill       Review of Symptoms:  Constitutional: c/o malaise, denies fever or chills  Skin: Negative for rash or lesion  Head: Negative for facial swelling or tenderness  Eyes: Negative for redness or discharge  Ears: Negative for otalgia or decreased hearing  Nose: c/o nasal congestion, denies sinus pressure  Neck: c/o sore throat, denies lymphadenopathy   Cardiovascular: Negative for chest pain or palpitations  Respiratory: c/o non-productive cough, denies wheezing or SOB  Gastrointestinal: Negative for nausea or abdominal pain  Neurologic: Negative for headache or dizziness      Visit Vitals  /78 (BP 1 Location: Left arm, BP Patient Position: Sitting)   Pulse (!) 106   Temp 99.2 °F (37.3 °C) (Oral)   Resp 18   Ht 5' 5.5\" (1.664 m)   Wt 140 lb (63.5 kg)   SpO2 96%   BMI 22.94 kg/m²       Physical Examination:  General: Well developed, well nourished, in no acute distress  Skin: Warm and dry  Head: Normocephalic, atraumatic  Eyes: Sclera clear, EOMI, PERRL  Ears: tympanic membranes normal in appearance  Nose: mucosal edema with rhinorrhea  Oropharynx: posterior erythema, no exudate   Neck: Normal range of motion, no lymphadenopathy  Cardiovascular: normal S1, S2, regular rate and rhythm  Respiratory: Clear to auscultation bilaterally with symmetrical, unlabored effort  Abdomen: Soft, Normal BS  Extremities: Full range of motion  Neurologic: Active, alert and oriented      Diagnoses and all orders for this visit:    1. Influenza B  -     Brompheniram-PSE-Chlophedianol (ATUSS DA) 2-30-12.5 mg/5 mL liqd; Take 10 mL by mouth every eight (8) hours as needed for Cough. Do NOT eat or drink for 5 minutes after taking.  -     oseltamivir (TAMIFLU) 75 mg capsule; Take 1 Cap by mouth two (2) times a day for 5 days.     2. Sore throat  -     AMB POC RAPID STREP A  -     AMB POC RAPID INFLUENZA TEST    3. Near syncope    4. Hypotension, unspecified hypotension type  -     AMB POC GLUCOSE BLOOD, BY GLUCOSE MONITORING DEVICE  -     COLLECTION CAPILLARY BLOOD SPECIMEN        Symptomatic therapy suggested: rest, increase fluids, gargle prn for sore throat and call prn if symptoms persist or worsen. I have discussed the diagnosis with the patient and the intended plan as seen in the above orders. The patient expresses understanding and agreement with our plan of care. All of the patient's questions were answered to apparent satisfaction. The patient has received an after-visit summary. The patient knows to call our office if there are any questions or concerns regarding diagnosis and treatment plans. I have discussed medication side effects and warnings with the patient as well. Follow-up and Dispositions    · Return if symptoms worsen or fail to improve.

## 2020-02-11 ENCOUNTER — OFFICE VISIT (OUTPATIENT)
Dept: FAMILY MEDICINE CLINIC | Age: 30
End: 2020-02-11

## 2020-02-11 VITALS
SYSTOLIC BLOOD PRESSURE: 106 MMHG | BODY MASS INDEX: 22.98 KG/M2 | HEIGHT: 66 IN | DIASTOLIC BLOOD PRESSURE: 74 MMHG | HEART RATE: 119 BPM | OXYGEN SATURATION: 95 % | RESPIRATION RATE: 16 BRPM | TEMPERATURE: 99.9 F | WEIGHT: 143 LBS

## 2020-02-11 DIAGNOSIS — R11.2 INTRACTABLE VOMITING WITH NAUSEA, UNSPECIFIED VOMITING TYPE: Primary | ICD-10-CM

## 2020-02-11 DIAGNOSIS — R52 BODY ACHES: ICD-10-CM

## 2020-02-11 DIAGNOSIS — R00.0 TACHYCARDIA: ICD-10-CM

## 2020-02-11 DIAGNOSIS — R19.7 DIARRHEA, UNSPECIFIED TYPE: ICD-10-CM

## 2020-02-11 RX ORDER — ONDANSETRON 4 MG/1
4 TABLET, ORALLY DISINTEGRATING ORAL
Qty: 30 TAB | Refills: 0 | Status: SHIPPED | OUTPATIENT
Start: 2020-02-11 | End: 2022-05-20

## 2020-02-11 NOTE — PATIENT INSTRUCTIONS
Gastroenteritis: Care Instructions  Your Care Instructions  Gastroenteritis is an illness that may cause nausea, vomiting, and diarrhea. It is sometimes called \"stomach flu. \" It can be caused by bacteria or a virus. You will probably begin to feel better in 1 to 2 days. In the meantime, get plenty of rest and make sure you do not become dehydrated. Dehydration occurs when your body loses too much fluid. Follow-up care is a key part of your treatment and safety. Be sure to make and go to all appointments, and call your doctor if you are having problems. It's also a good idea to know your test results and keep a list of the medicines you take. How can you care for yourself at home? · If your doctor prescribed antibiotics, take them as directed. Do not stop taking them just because you feel better. You need to take the full course of antibiotics. · Drink plenty of fluids to prevent dehydration, enough so that your urine is light yellow or clear like water. Choose water and other caffeine-free clear liquids until you feel better. If you have kidney, heart, or liver disease and have to limit fluids, talk with your doctor before you increase your fluid intake. · Drink fluids slowly, in frequent, small amounts, because drinking too much too fast can cause vomiting. · Begin eating mild foods, such as dry toast, yogurt, applesauce, bananas, and rice. Avoid spicy, hot, or high-fat foods, and do not drink alcohol or caffeine for a day or two. Do not drink milk or eat ice cream until you are feeling better. How to prevent gastroenteritis  · Keep hot foods hot and cold foods cold. · Do not eat meats, dressings, salads, or other foods that have been kept at room temperature for more than 2 hours. · Use a thermometer to check your refrigerator. It should be between 34°F and 40°F.  · Defrost meats in the refrigerator or microwave, not on the kitchen counter. · Keep your hands and your kitchen clean.  Wash your hands, cutting boards, and countertops with hot soapy water frequently. · Cook meat until it is well done. · Do not eat raw eggs or uncooked sauces made with raw eggs. · Do not take chances. If food looks or tastes spoiled, throw it out. When should you call for help? Call 911 anytime you think you may need emergency care. For example, call if:    · You vomit blood or what looks like coffee grounds.     · You passed out (lost consciousness).     · You pass maroon or very bloody stools.    Call your doctor now or seek immediate medical care if:    · You have severe belly pain.     · You have signs of needing more fluids. You have sunken eyes, a dry mouth, and pass only a little dark urine.     · You feel like you are going to faint.     · You have increased belly pain that does not go away in 1 to 2 days.     · You have new or increased nausea, or you are vomiting.     · You have a new or higher fever.     · Your stools are black and tarlike or have streaks of blood.    Watch closely for changes in your health, and be sure to contact your doctor if:    · You are dizzy or lightheaded.     · You urinate less than usual, or your urine is dark yellow or brown.     · You do not feel better with each day that goes by. Where can you learn more? Go to http://vance-hiren.info/. Enter N142 in the search box to learn more about \"Gastroenteritis: Care Instructions. \"  Current as of: June 9, 2019  Content Version: 12.2  © 1335-8010 [employer]. Care instructions adapted under license by [employer] (which disclaims liability or warranty for this information). If you have questions about a medical condition or this instruction, always ask your healthcare professional. [address] any warranty or liability for your use of this information.

## 2020-02-11 NOTE — PROGRESS NOTES
Progress Note    Patient: Ken Osorio MRN: [id number]  SSN: [id number]    YOB: 1990  Age: 26 y.o. Sex: female        Chief Complaint   Patient presents with    Diarrhea     vomiting         Subjective:     Problems addressed:  Encounter Diagnoses     ICD-10-CM ICD-9-CM   1. Intractable vomiting with nausea, unspecified vomiting type R11.2 536.2   2. Body aches R52 780.96   3. Diarrhea, unspecified type R19.7 787.91   4. Tachycardia R00.0 785.0       HPI: 26 y.o. y/o female presents for diarrhea and vomiting. Says she can't keep any food down. Symptoms began yesterday. Has had unknown number of diarrheal episodes, and vomiting about 7-8 times over the past 24 hours, tried to eat crackers but still threw that up. Complains of some body aches, but denies any fever, chills, CP, SOB, blood in urine or stool, or dysuria. Denies sick contacts. Current and past medical information:    Current Medications after this visit[de-identified]     Current Outpatient Medications   Medication Sig    ondansetron (ZOFRAN ODT) 4 mg disintegrating tablet Take 1 Tab by mouth every eight (8) hours as needed for Nausea or Vomiting.  norgestimate-ethinyl estradiol (SPRINTEC, 28,) 0.25-35 mg-mcg per tablet Take 1 Tab by mouth daily.  Brompheniram-PSE-Chlophedianol (ATUSS DA) 2-30-12.5 mg/5 mL liqd Take 10 mL by mouth every eight (8) hours as needed for Cough. Do NOT eat or drink for 5 minutes after taking.  cyclobenzaprine (FLEXERIL) 5 mg tablet Take 1 Tab by mouth two (2) times daily as needed for Muscle Spasm(s). No current facility-administered medications for this visit. There are no active problems to display for this patient. History reviewed. No pertinent past medical history. Allergies   Allergen Reactions    Bees [Hymenoptera Allergenic Extract] Swelling       History reviewed. No pertinent surgical history.     Social History     Tobacco Use    Smoking status: Never Smoker    Smokeless tobacco: Never Used   Substance Use Topics    Alcohol use: Yes     Comment: social    Drug use: No         Review of Systems   Constitutional: Positive for malaise/fatigue. Negative for chills and fever. Respiratory: Negative for cough. Cardiovascular: Negative for chest pain. Gastrointestinal: Positive for diarrhea, nausea and vomiting. Negative for constipation. Genitourinary: Negative for dysuria, hematuria and urgency. Neurological: Negative for dizziness and loss of consciousness. Objective:     Vitals:    02/11/20 1351   BP: 106/74   Pulse: (!) 119   Resp: 16   Temp: 99.9 °F (37.7 °C)   TempSrc: Oral   SpO2: 95%   Weight: 143 lb (64.9 kg)   Height: 5' 5.5\" (1.664 m)      Body mass index is 23.43 kg/m². Physical Exam  Vitals signs reviewed. Constitutional:       Appearance: Normal appearance. She is normal weight. She is ill-appearing. She is not toxic-appearing. HENT:      Head: Normocephalic and atraumatic. Right Ear: External ear normal.      Left Ear: External ear normal.   Eyes:      Conjunctiva/sclera: Conjunctivae normal.   Neck:      Musculoskeletal: Neck supple. Cardiovascular:      Rate and Rhythm: Regular rhythm. Tachycardia present. Heart sounds: No murmur. Pulmonary:      Effort: Pulmonary effort is normal. No respiratory distress. Breath sounds: No wheezing or rhonchi. Abdominal:      General: Bowel sounds are normal. There is no distension. Palpations: Abdomen is soft. Tenderness: There is abdominal tenderness (mild generalized tenderness to palpation). There is no guarding or rebound. Skin:     General: Skin is warm and dry. Neurological:      General: No focal deficit present. Mental Status: She is alert.    Psychiatric:         Mood and Affect: Mood normal.         Behavior: Behavior normal.          Health Maintenance Due   Topic Date Due    DTaP/Tdap/Td series (1 - Tdap) 06/07/2000    PAP AKA CERVICAL CYTOLOGY  06/07/2010  Influenza Age 5 to Adult  08/01/2019       Assessment and orders:     Encounter Diagnoses     ICD-10-CM ICD-9-CM   1. Intractable vomiting with nausea, unspecified vomiting type R11.2 536.2   2. Body aches R52 780.96   3. Diarrhea, unspecified type R19.7 787.91   4. Tachycardia R00.0 785.0       27 y/o F with 1 day of vomiting and diarrhea    1. Vomiting, body aches, and diarrhea - likely 2/2 to gastroenteritis, rapid flu negative, pregnancy test negative  - AMB POC URINE PREGNANCY TEST, VISUAL COLOR COMPARISON  - AMB POC RAPID INFLUENZA TEST  -START ondansetron (ZOFRAN ODT) 4 mg disintegrating tablet; Take 1 Tab by mouth every eight (8) hours as needed for Nausea or Vomiting. Dispense: 30 Tab; Refill: 0  -Encouraging good PO hydration  -Return to clinic if symptoms worsen or do not improve    2. Tachycardia - likely 2/2 to acute illness, pt non-toxic on exam  -Supportive care  -Return to clinic if symptoms worsen or do not improve        Plan of care:  Discussed diagnoses in detail with patient. Medication risks/benefits/side effects discussed with patient. All of the patient's questions were addressed. The patient understands and agrees with our plan of care. The patient knows to call back if they are unsure of or forget any changes we discussed today or if the symptoms change. The patient received an After-Visit Summary which contains VS, orders, medication list and allergy list. This can be used as a \"mini-medical record\" should they have to seek medical care while out of town. Follow-up and Dispositions    · Return if symptoms worsen or fail to improve. No future appointments.     Signed By: Kevin John MD     February 11, 2020

## 2020-02-11 NOTE — PROGRESS NOTES
I reviewed with the resident the medical history and the resident's findings on the physical examination. I discussed with the resident the patient's diagnosis and concur with the plan. Strict ER precautions given for symptoms of dehydration and hypotension, but believe she will do well with Zofran.

## 2020-02-11 NOTE — PROGRESS NOTES
1. Have you been to the ER, urgent care clinic since your last visit? Hospitalized since your last visit? No    2. Have you seen or consulted any other health care providers outside of the [address] [place] [address] since your last visit? Include any pap smears or colon screening.  No  Reviewed record in preparation for visit and have necessary documentation  Pt did not bring medication to office visit for review    Goals that were addressed and/or need to be completed during or after this appointment include   Health Maintenance Due   Topic Date Due    DTaP/Tdap/Td series (1 - Tdap) 06/07/2000    PAP AKA CERVICAL CYTOLOGY  06/07/2010    Influenza Age 9 to Adult  08/01/2019

## 2020-02-12 LAB
FLUAV+FLUBV AG NOSE QL IA.RAPID: NEGATIVE POS/NEG
FLUAV+FLUBV AG NOSE QL IA.RAPID: NEGATIVE POS/NEG
HCG URINE, QL. (POC): NEGATIVE
VALID INTERNAL CONTROL?: YES
VALID INTERNAL CONTROL?: YES

## 2020-12-04 ENCOUNTER — OFFICE VISIT (OUTPATIENT)
Dept: FAMILY MEDICINE CLINIC | Age: 30
End: 2020-12-04
Payer: COMMERCIAL

## 2020-12-04 ENCOUNTER — TELEPHONE (OUTPATIENT)
Dept: FAMILY MEDICINE CLINIC | Age: 30
End: 2020-12-04

## 2020-12-04 DIAGNOSIS — R35.0 URINE FREQUENCY: Primary | ICD-10-CM

## 2020-12-04 DIAGNOSIS — N30.01 ACUTE CYSTITIS WITH HEMATURIA: ICD-10-CM

## 2020-12-04 DIAGNOSIS — R30.0 DYSURIA: ICD-10-CM

## 2020-12-04 LAB
BILIRUB UR QL STRIP: NEGATIVE
GLUCOSE UR-MCNC: NEGATIVE MG/DL
KETONES P FAST UR STRIP-MCNC: NEGATIVE MG/DL
PH UR STRIP: 6.5 [PH] (ref 4.6–8)
PROT UR QL STRIP: NEGATIVE
SP GR UR STRIP: 1.01 (ref 1–1.03)
UA UROBILINOGEN AMB POC: NORMAL (ref 0.2–1)
URINALYSIS CLARITY POC: CLEAR
URINALYSIS COLOR POC: NORMAL
URINE BLOOD POC: NORMAL
URINE LEUKOCYTES POC: NORMAL
URINE NITRITES POC: NEGATIVE

## 2020-12-04 PROCEDURE — 81003 URINALYSIS AUTO W/O SCOPE: CPT | Performed by: STUDENT IN AN ORGANIZED HEALTH CARE EDUCATION/TRAINING PROGRAM

## 2020-12-04 PROCEDURE — 99214 OFFICE O/P EST MOD 30 MIN: CPT | Performed by: STUDENT IN AN ORGANIZED HEALTH CARE EDUCATION/TRAINING PROGRAM

## 2020-12-04 RX ORDER — SULFAMETHOXAZOLE AND TRIMETHOPRIM 800; 160 MG/1; MG/1
1 TABLET ORAL 2 TIMES DAILY
Qty: 6 TAB | Refills: 0 | Status: SHIPPED | OUTPATIENT
Start: 2020-12-04 | End: 2020-12-07

## 2020-12-04 NOTE — PROGRESS NOTES
[address] Medicine Residency Attending Addendum:  Dr. Srinivasan Urbina MD,  the patient and I were not physically present during this encounter. The resident and I are concurrently monitoring the patient care through appropriate telecommunication technology. I discussed the findings, assessment and plan with the resident and agree with the resident's findings and plan as documented in the resident's note.       Morgan Bautista MD

## 2020-12-04 NOTE — PROGRESS NOTES
Violeta Warren  31 y.o. female  1990  [address] [facility]  [id number]   [facility]:    Telemedicine Progress Note  Eugene Anderson MD       Encounter Date and Time: December 4, 2020 at 10:23 AM    Consent:  She and/or the health care decision maker is aware that that she may receive a bill for this telephone service, depending on her insurance coverage, and has provided verbal consent to proceed: Yes    Chief Complaint   Patient presents with    Urinary Pain     History of Present Illness   Violeta Warren is a 31 y.o. female was evaluated by synchronous (real-time) audio-video technology from home. Pt reports symptoms started this morning with \"I got to go feeling\" and burning with urination. Has had similar symptoms before with UTI. Has had kidney infection before. Denies any blood in urine. Denies any fever, chills, cough, SOB, abdominal pain, N/V, or diarrhea. Pt drinking cranberry juice for symptoms. \"I always drink a lot of water\". Review of Systems   Review of Systems   Constitutional: Negative for chills and fever. Respiratory: Negative for cough and shortness of breath. Gastrointestinal: Negative for abdominal pain, nausea and vomiting. Genitourinary: Positive for dysuria and frequency. Negative for flank pain and hematuria. Musculoskeletal: Negative for myalgias. Neurological: Negative for dizziness and headaches. Vitals/Objective:     General: alert, cooperative, no distress   Mental  status: mental status: alert, oriented to person, place, and time, normal mood, behavior, speech, dress, motor activity, and thought processes   Resp: resp: normal effort, no respiratory distress and not coughing   Neuro: neuro: no gross deficits   Skin: skin: no discoloration or lesions of concern on visible areas   Due to this being a TeleHealth evaluation, many elements of the physical examination are unable to be assessed.       Results for orders placed or performed in visit on 12/04/20   AMB POC URINALYSIS DIP STICK AUTO W/O MICRO     Status: None   Result Value Ref Range Status    Color (UA POC) Light Yellow  Final    Clarity (UA POC) Clear  Final    Glucose (UA POC) Negative Negative Final    Bilirubin (UA POC) Negative Negative Final    Ketones (UA POC) Negative Negative Final    Specific gravity (UA POC) 1.010 1.001 - 1.035 Final    Blood (UA POC) Trace Negative Final     Comment: lysed    pH (UA POC) 6.5 4.6 - 8.0 Final    Protein (UA POC) Negative Negative Final    Urobilinogen (UA POC) 0.2 mg/dL 0.2 - 1 Final    Nitrites (UA POC) Negative Negative Final    Leukocyte esterase (UA POC) Trace Negative Final       Assessment and Plan:   Time-based coding, delete if not needed: I spent at least 15 minutes with this established patient, and >50% of the time was spent counseling and/or coordinating care regarding UTI    Tiki Sylvester is a 31 y.o. female with symptoms of UTI    1. UTI - pt with dysuria and frequency for 1 day, UA shows trace LE and trace blood, will send antibiotic to pharmacy, but pt states will see if will go away on its own and wait for culture results, or will go ahead and  antibiotic if symptoms worsening, prior UCx shows E coli sensitive to Bactrim  - AMB POC URINALYSIS DIP STICK AUTO W/O MICRO  - trimethoprim-sulfamethoxazole (BACTRIM DS, SEPTRA DS) 160-800 mg per tablet; Take 1 Tab by mouth two (2) times a day for 3 days. For urinary tract infection. Dispense: 6 Tab; Refill: 0  - CULTURE, URINE  -Return to clinic if symptoms worsen or do not improve  -Encouraging good PO hydration      Time spent in direct conversation with the patient to include medical condition(s) discussed, assessment and treatment plan:       We discussed the expected course, resolution and complications of the diagnosis(es) in detail. Medication risks, benefits, costs, interactions, and alternatives were discussed as indicated.   I advised her to contact the office if her condition worsens, changes or fails to improve as anticipated. She expressed understanding with the diagnosis(es) and plan. Patient understands that this encounter was a temporary measure, and the importance of further follow up and examination was emphasized. Patient verbalized understanding. Patient informed to follow up: Follow-up and Dispositions  ·   Return if symptoms worsen or fail to improve. Electronically Signed: Tracey Caceres MD    Providers location when delivering service (clinic, hospital, home): Home      ICD-10-CM ICD-9-CM    1. Urine frequency  R35.0 788.41 AMB POC URINALYSIS DIP STICK AUTO W/O MICRO   2. Dysuria  R30.0 788.1 trimethoprim-sulfamethoxazole (BACTRIM DS, SEPTRA DS) 160-800 mg per tablet      CULTURE, URINE      CULTURE, URINE   3. Acute cystitis with hematuria  N30.01 595.0 trimethoprim-sulfamethoxazole (BACTRIM DS, SEPTRA DS) 160-800 mg per tablet      CULTURE, URINE      CULTURE, URINE         Pursuant to the emergency declaration under the [facility], [facility] waiver authority and the [employer] Act, this Virtual  Visit was conducted, with patient's consent, to reduce the patient's risk of exposure to COVID-19 and provide continuity of care for an established patient. Services were provided through a video synchronous discussion virtually to substitute for in-person clinic visit. History   Patients past medical, surgical and family histories were reviewed. No past medical history on file. No past surgical history on file.   Family History   Problem Relation Age of Onset    Cancer Maternal Aunt     Diabetes Maternal Grandmother     Diabetes Maternal Grandfather     Heart Disease Maternal Grandfather     Stroke Paternal Grandmother      Social History     Socioeconomic History    Marital status:      Spouse name: Not on file    Number of children: Not on file    Years of education: Not on file    Highest education level: Not on file   Occupational History    Not on file   Social Needs    Financial resource strain: Not on file    Food insecurity     Worry: Not on file     Inability: Not on file    Transportation needs     Medical: Not on file     Non-medical: Not on file   Tobacco Use    Smoking status: Never Smoker    Smokeless tobacco: Never Used   Substance and Sexual Activity    Alcohol use: Yes     Comment: social    Drug use: No    Sexual activity: Yes     Partners: Male     Birth control/protection: Pill   Lifestyle    Physical activity     Days per week: Not on file     Minutes per session: Not on file    Stress: Not on file   Relationships    Social connections     Talks on phone: Not on file     Gets together: Not on file     Attends [employer] service: Not on file     Active member of club or organization: Not on file     Attends meetings of clubs or organizations: Not on file     Relationship status: Not on file    Intimate partner violence     Fear of current or ex partner: Not on file     Emotionally abused: Not on file     Physically abused: Not on file     Forced sexual activity: Not on file   Other Topics Concern    Not on file   Social History Narrative    Not on file     There is no problem list on file for this patient. Current Medications/Allergies   Medications and Allergies reviewed:    Current Outpatient Medications   Medication Sig Dispense Refill    ondansetron (ZOFRAN ODT) 4 mg disintegrating tablet Take 1 Tab by mouth every eight (8) hours as needed for Nausea or Vomiting. 30 Tab 0    Brompheniram-PSE-Chlophedianol (ATUSS DA) 2-30-12.5 mg/5 mL liqd Take 10 mL by mouth every eight (8) hours as needed for Cough. Do NOT eat or drink for 5 minutes after taking. 1 Bottle 0    cyclobenzaprine (FLEXERIL) 5 mg tablet Take 1 Tab by mouth two (2) times daily as needed for Muscle Spasm(s).  20 Tab 0    norgestimate-ethinyl estradiol (SPRINTEC, 28,) 0.25-35 mg-mcg per tablet Take 1 Tab by mouth daily.  1 Package 12     Allergies   Allergen Reactions    Bees [Hymenoptera Allergenic Extract] Swelling

## 2020-12-04 NOTE — TELEPHONE ENCOUNTER
Patient came into office to make an appointment so I went ahead and collected and ran urine; results already entered.

## 2020-12-06 LAB
BACTERIA SPEC CULT: NORMAL
SERVICE CMNT-IMP: NORMAL

## 2020-12-07 ENCOUNTER — TELEPHONE (OUTPATIENT)
Dept: FAMILY MEDICINE CLINIC | Age: 30
End: 2020-12-07

## 2020-12-07 NOTE — TELEPHONE ENCOUNTER
Called pt to discuss lab results, urine culture showed no growth, pt has not picked up or taken antibiotic. Symptoms are getting better at this time, not having any dysuria anymore. All questions answered.     Juaquin Mckeon MD  10:41 AM

## 2021-12-17 LAB
CHLAMYDIA, EXTERNAL: NEGATIVE
N. GONORRHEA, EXTERNAL: NEGATIVE

## 2022-01-19 LAB
ANTIBODY SCREEN, EXTERNAL: NEGATIVE
HBSAG, EXTERNAL: NEGATIVE
HIV, EXTERNAL: NEGATIVE
RPR, EXTERNAL: NORMAL
RUBELLA, EXTERNAL: NORMAL
TYPE, ABO & RH, EXTERNAL: NORMAL

## 2022-04-22 LAB — GRBS, EXTERNAL: NEGATIVE

## 2022-05-18 ENCOUNTER — HOSPITAL ENCOUNTER (INPATIENT)
Age: 32
LOS: 2 days | Discharge: HOME | End: 2022-05-20
Attending: STUDENT IN AN ORGANIZED HEALTH CARE EDUCATION/TRAINING PROGRAM | Admitting: OBSTETRICS & GYNECOLOGY
Payer: COMMERCIAL

## 2022-05-18 PROBLEM — Z34.90 PREGNANT: Status: ACTIVE | Noted: 2022-05-18

## 2022-05-18 LAB
BASOPHILS # BLD: 0 K/UL (ref 0–0.1)
BASOPHILS NFR BLD: 0 % (ref 0–1)
DIFFERENTIAL METHOD BLD: ABNORMAL
EOSINOPHIL # BLD: 0 K/UL (ref 0–0.4)
EOSINOPHIL NFR BLD: 0 % (ref 0–7)
ERYTHROCYTE [DISTWIDTH] IN BLOOD BY AUTOMATED COUNT: 13.5 % (ref 11.5–14.5)
HCT VFR BLD AUTO: 35.7 % (ref 35–47)
HGB BLD-MCNC: 12.1 G/DL (ref 11.5–16)
IMM GRANULOCYTES # BLD AUTO: 0.2 K/UL (ref 0–0.04)
IMM GRANULOCYTES NFR BLD AUTO: 2 % (ref 0–0.5)
LYMPHOCYTES # BLD: 2.2 K/UL (ref 0.8–3.5)
LYMPHOCYTES NFR BLD: 19 % (ref 12–49)
MCH RBC QN AUTO: 31.8 PG (ref 26–34)
MCHC RBC AUTO-ENTMCNC: 33.9 G/DL (ref 30–36.5)
MCV RBC AUTO: 93.9 FL (ref 80–99)
MONOCYTES # BLD: 0.8 K/UL (ref 0–1)
MONOCYTES NFR BLD: 7 % (ref 5–13)
NEUTS SEG # BLD: 8.2 K/UL (ref 1.8–8)
NEUTS SEG NFR BLD: 72 % (ref 32–75)
NRBC # BLD: 0 K/UL (ref 0–0.01)
NRBC BLD-RTO: 0 PER 100 WBC
PLATELET # BLD AUTO: 201 K/UL (ref 150–400)
PMV BLD AUTO: 12.9 FL (ref 8.9–12.9)
RBC # BLD AUTO: 3.8 M/UL (ref 3.8–5.2)
WBC # BLD AUTO: 11.4 K/UL (ref 3.6–11)

## 2022-05-18 PROCEDURE — 4A1HXCZ MONITORING OF PRODUCTS OF CONCEPTION, CARDIAC RATE, EXTERNAL APPROACH: ICD-10-PCS | Performed by: OBSTETRICS & GYNECOLOGY

## 2022-05-18 PROCEDURE — 36415 COLL VENOUS BLD VENIPUNCTURE: CPT

## 2022-05-18 PROCEDURE — 75410000003 HC RECOV DEL/VAG/CSECN EA 0.5 HR: Performed by: OBSTETRICS & GYNECOLOGY

## 2022-05-18 PROCEDURE — 74011250636 HC RX REV CODE- 250/636

## 2022-05-18 PROCEDURE — 74011250636 HC RX REV CODE- 250/636: Performed by: OBSTETRICS & GYNECOLOGY

## 2022-05-18 PROCEDURE — 86900 BLOOD TYPING SEROLOGIC ABO: CPT

## 2022-05-18 PROCEDURE — 75410000002 HC LABOR FEE PER 1 HR: Performed by: OBSTETRICS & GYNECOLOGY

## 2022-05-18 PROCEDURE — 75410000000 HC DELIVERY VAGINAL/SINGLE: Performed by: OBSTETRICS & GYNECOLOGY

## 2022-05-18 PROCEDURE — 65410000002 HC RM PRIVATE OB

## 2022-05-18 PROCEDURE — 74011000250 HC RX REV CODE- 250: Performed by: OBSTETRICS & GYNECOLOGY

## 2022-05-18 PROCEDURE — 85025 COMPLETE CBC W/AUTO DIFF WBC: CPT

## 2022-05-18 PROCEDURE — 65270000029 HC RM PRIVATE

## 2022-05-18 RX ORDER — SODIUM CHLORIDE, SODIUM LACTATE, POTASSIUM CHLORIDE, CALCIUM CHLORIDE 600; 310; 30; 20 MG/100ML; MG/100ML; MG/100ML; MG/100ML
125 INJECTION, SOLUTION INTRAVENOUS CONTINUOUS
Status: DISCONTINUED | OUTPATIENT
Start: 2022-05-18 | End: 2022-05-20 | Stop reason: ALTCHOICE

## 2022-05-18 RX ORDER — ADHESIVE BANDAGE
30 BANDAGE TOPICAL DAILY PRN
Status: DISCONTINUED | OUTPATIENT
Start: 2022-05-18 | End: 2022-05-20 | Stop reason: HOSPADM

## 2022-05-18 RX ORDER — OXYTOCIN/RINGER'S LACTATE 30/500 ML
PLASTIC BAG, INJECTION (ML) INTRAVENOUS
Status: DISPENSED
Start: 2022-05-18 | End: 2022-05-19

## 2022-05-18 RX ORDER — OXYTOCIN/RINGER'S LACTATE 30/500 ML
10 PLASTIC BAG, INJECTION (ML) INTRAVENOUS AS NEEDED
Status: DISCONTINUED | OUTPATIENT
Start: 2022-05-18 | End: 2022-05-20 | Stop reason: ALTCHOICE

## 2022-05-18 RX ORDER — NALOXONE HYDROCHLORIDE 0.4 MG/ML
0.4 INJECTION, SOLUTION INTRAMUSCULAR; INTRAVENOUS; SUBCUTANEOUS AS NEEDED
Status: DISCONTINUED | OUTPATIENT
Start: 2022-05-18 | End: 2022-05-20 | Stop reason: HOSPADM

## 2022-05-18 RX ORDER — HYDROCORTISONE ACETATE PRAMOXINE HCL 2.5; 1 G/100G; G/100G
CREAM TOPICAL AS NEEDED
Status: DISCONTINUED | OUTPATIENT
Start: 2022-05-18 | End: 2022-05-18

## 2022-05-18 RX ORDER — OXYTOCIN/RINGER'S LACTATE 30/500 ML
10 PLASTIC BAG, INJECTION (ML) INTRAVENOUS AS NEEDED
Status: DISCONTINUED | OUTPATIENT
Start: 2022-05-18 | End: 2022-05-18

## 2022-05-18 RX ORDER — SODIUM CHLORIDE 0.9 % (FLUSH) 0.9 %
5-40 SYRINGE (ML) INJECTION AS NEEDED
Status: DISCONTINUED | OUTPATIENT
Start: 2022-05-18 | End: 2022-05-20 | Stop reason: ALTCHOICE

## 2022-05-18 RX ORDER — IBUPROFEN 800 MG/1
800 TABLET ORAL EVERY 8 HOURS
Status: DISCONTINUED | OUTPATIENT
Start: 2022-05-19 | End: 2022-05-20 | Stop reason: HOSPADM

## 2022-05-18 RX ORDER — OXYTOCIN/RINGER'S LACTATE 30/500 ML
87.3 PLASTIC BAG, INJECTION (ML) INTRAVENOUS AS NEEDED
Status: DISCONTINUED | OUTPATIENT
Start: 2022-05-18 | End: 2022-05-20 | Stop reason: ALTCHOICE

## 2022-05-18 RX ORDER — SODIUM CHLORIDE 0.9 % (FLUSH) 0.9 %
5-40 SYRINGE (ML) INJECTION EVERY 8 HOURS
Status: CANCELLED | OUTPATIENT
Start: 2022-05-18

## 2022-05-18 RX ORDER — ACETAMINOPHEN 325 MG/1
650 TABLET ORAL
Status: DISCONTINUED | OUTPATIENT
Start: 2022-05-18 | End: 2022-05-20 | Stop reason: HOSPADM

## 2022-05-18 RX ORDER — NALOXONE HYDROCHLORIDE 0.4 MG/ML
0.4 INJECTION, SOLUTION INTRAMUSCULAR; INTRAVENOUS; SUBCUTANEOUS AS NEEDED
Status: DISCONTINUED | OUTPATIENT
Start: 2022-05-18 | End: 2022-05-19 | Stop reason: HOSPADM

## 2022-05-18 RX ORDER — HYDROMORPHONE HYDROCHLORIDE 1 MG/ML
1 INJECTION, SOLUTION INTRAMUSCULAR; INTRAVENOUS; SUBCUTANEOUS
Status: DISCONTINUED | OUTPATIENT
Start: 2022-05-18 | End: 2022-05-19 | Stop reason: HOSPADM

## 2022-05-18 RX ORDER — OXYTOCIN/RINGER'S LACTATE 30/500 ML
87.3 PLASTIC BAG, INJECTION (ML) INTRAVENOUS AS NEEDED
Status: DISCONTINUED | OUTPATIENT
Start: 2022-05-18 | End: 2022-05-18

## 2022-05-18 RX ORDER — OXYTOCIN/RINGER'S LACTATE 30/500 ML
87.3 PLASTIC BAG, INJECTION (ML) INTRAVENOUS AS NEEDED
Status: COMPLETED | OUTPATIENT
Start: 2022-05-18 | End: 2022-05-18

## 2022-05-18 RX ORDER — ONDANSETRON 2 MG/ML
4 INJECTION INTRAMUSCULAR; INTRAVENOUS
Status: DISCONTINUED | OUTPATIENT
Start: 2022-05-18 | End: 2022-05-20 | Stop reason: HOSPADM

## 2022-05-18 RX ORDER — ONDANSETRON 2 MG/ML
4 INJECTION INTRAMUSCULAR; INTRAVENOUS
Status: DISCONTINUED | OUTPATIENT
Start: 2022-05-18 | End: 2022-05-18

## 2022-05-18 RX ORDER — LIDOCAINE HYDROCHLORIDE 10 MG/ML
10 INJECTION INFILTRATION; PERINEURAL ONCE
Status: COMPLETED | OUTPATIENT
Start: 2022-05-18 | End: 2022-05-18

## 2022-05-18 RX ORDER — DIPHENHYDRAMINE HYDROCHLORIDE 50 MG/ML
12.5 INJECTION, SOLUTION INTRAMUSCULAR; INTRAVENOUS
Status: DISCONTINUED | OUTPATIENT
Start: 2022-05-18 | End: 2022-05-20 | Stop reason: HOSPADM

## 2022-05-18 RX ORDER — SODIUM CHLORIDE, SODIUM LACTATE, POTASSIUM CHLORIDE, CALCIUM CHLORIDE 600; 310; 30; 20 MG/100ML; MG/100ML; MG/100ML; MG/100ML
125 INJECTION, SOLUTION INTRAVENOUS CONTINUOUS
Status: CANCELLED | OUTPATIENT
Start: 2022-05-18

## 2022-05-18 RX ORDER — HYDROCODONE BITARTRATE AND ACETAMINOPHEN 5; 325 MG/1; MG/1
1 TABLET ORAL
Status: DISCONTINUED | OUTPATIENT
Start: 2022-05-18 | End: 2022-05-20 | Stop reason: HOSPADM

## 2022-05-18 RX ORDER — OXYTOCIN/RINGER'S LACTATE 30/500 ML
PLASTIC BAG, INJECTION (ML) INTRAVENOUS
Status: COMPLETED
Start: 2022-05-18 | End: 2022-05-18

## 2022-05-18 RX ORDER — SODIUM CHLORIDE 0.9 % (FLUSH) 0.9 %
5-40 SYRINGE (ML) INJECTION AS NEEDED
Status: CANCELLED | OUTPATIENT
Start: 2022-05-18

## 2022-05-18 RX ORDER — SODIUM CHLORIDE 0.9 % (FLUSH) 0.9 %
5-40 SYRINGE (ML) INJECTION EVERY 8 HOURS
Status: DISCONTINUED | OUTPATIENT
Start: 2022-05-18 | End: 2022-05-20 | Stop reason: ALTCHOICE

## 2022-05-18 RX ORDER — TRANEXAMIC ACID 100 MG/ML
1 INJECTION, SOLUTION INTRAVENOUS ONCE
Status: COMPLETED | OUTPATIENT
Start: 2022-05-19 | End: 2022-05-18

## 2022-05-18 RX ORDER — METHYLERGONOVINE MALEATE 0.2 MG/ML
0.2 INJECTION INTRAVENOUS ONCE
Status: COMPLETED | OUTPATIENT
Start: 2022-05-19 | End: 2022-05-18

## 2022-05-18 RX ORDER — SIMETHICONE 80 MG
80 TABLET,CHEWABLE ORAL
Status: DISCONTINUED | OUTPATIENT
Start: 2022-05-18 | End: 2022-05-20 | Stop reason: HOSPADM

## 2022-05-18 RX ADMIN — SODIUM CHLORIDE, POTASSIUM CHLORIDE, SODIUM LACTATE AND CALCIUM CHLORIDE 125 ML/HR: 600; 310; 30; 20 INJECTION, SOLUTION INTRAVENOUS at 18:01

## 2022-05-18 RX ADMIN — OXYTOCIN 30000 MILLI-UNITS: 10 INJECTION INTRAVENOUS at 22:52

## 2022-05-18 RX ADMIN — LIDOCAINE HYDROCHLORIDE 20 ML: 10 INJECTION, SOLUTION INFILTRATION; PERINEURAL at 22:52

## 2022-05-18 RX ADMIN — METHYLERGONOVINE MALEATE 0.2 MG: 0.2 INJECTION, SOLUTION INTRAMUSCULAR; INTRAVENOUS at 23:22

## 2022-05-18 RX ADMIN — Medication 125 MILLI-UNITS/MIN: at 23:28

## 2022-05-18 RX ADMIN — Medication 30000 MILLI-UNITS: at 22:52

## 2022-05-18 RX ADMIN — TRANEXAMIC ACID 1000 MG: 1 INJECTION, SOLUTION INTRAVENOUS at 23:25

## 2022-05-18 NOTE — PROGRESS NOTES
1322: Patient arrived to unit, room 210 via w/c. Reports c/o back pain since this am prior to doing yoga at home. States back pain is constant, rates 6/10 sharp pain scale. Denies headache, blurry vision or nausea. Respirations even, non labored. Denies vaginal bleeding or leakage of fluid, but small amount of brown discharge. 1332 Changed in to gown, allergies and med list reviewed. EFM applied. [address] [place] per writer, request check behind per charge nurse. 1352 SVE per Ronda Khan, 2/70/+1    65 Spoke with Dr. Farrah Garcia via telephone, report given on patient. Advises to monitor and let patient get up and walk. Recheck cervix in a few hours and call with update. 1416 EFM removed, patient up to bathroom then up ambulating on unit. 1504 Patient back to bed, EFM reapplied. 1620 Up to bathroom, back to bed and EFM reapplied. 1640 Warm compress to abdomen per patient request for contraction pain. Patient voices desire to try to go natural at this time. 1650 SVE per Nino Luque 4-5/90/+1    1658 Dr. Farrah Garcia updated on patient, will be in to see patient. 1715 UP to bathroom, returned to bed and EFM reapplied. Warm compress to lower abdomen, patient states this helps with pain management. Patient wants to know if she can be off of the EFM monitor intermittently. Will check with MD.    [address]-M Dr. Farrah Garcia to bedside, SVE 6/100/+1.    1749 EFM removed, up to bathroom    1800 Reposition right lateral with peanut. 18 Educated with use of peanut ball, repositioning and use of birthing ball. Spouse at bedside. Continues to decline epidural, managing pain with breathing techniques and use of warm compresses. 1920 Bedside shift change report given to J Carlos LE (oncoming nurse) by YUDITH Vaughn RN (offgoing nurse). Report included the following information SBAR, Kardex, MAR and Recent Results.

## 2022-05-18 NOTE — H&P
History & Physical    Name: Vandana Martinez MRN: [id number]  SSN: [id number]    YOB: 1990  Age: 27 y.o. Sex: female        Subjective:     Estimated Date of Delivery: None noted. OB History        1    Para        Term                AB        Living           SAB        IAB        Ectopic        Molar        Multiple        Live Births                    Ms. Yi Emmanuel is admitted with pregnancy at 40w1d for Increasingly painful contractions. Prenatal course was normal.   Please see prenatal records for details. No past medical history on file. No past surgical history on file. Social History     Occupational History    Not on file   Tobacco Use    Smoking status: Never Smoker    Smokeless tobacco: Never Used   Substance and Sexual Activity    Alcohol use: Yes     Comment: social    Drug use: No    Sexual activity: Yes     Partners: Male     Birth control/protection: Pill     Family History   Problem Relation Age of Onset    Cancer Maternal Aunt     Diabetes Maternal Grandmother     Diabetes Maternal Grandfather     Heart Disease Maternal Grandfather     Stroke Paternal Grandmother        Allergies   Allergen Reactions    Bees [Hymenoptera Allergenic Extract] Swelling    Other Medication Other (comments)     Muscle relaxer cyclobenzaprine, causes patient to pass out and make legs numb     Prior to Admission medications    Medication Sig Start Date End Date Taking? Authorizing Provider   PNV Comb #2-Iron-FA-Omega 3 29-1-400 mg cmpk Take  by mouth daily. Indications: pregnancy   Yes Provider, Historical   ondansetron (ZOFRAN ODT) 4 mg disintegrating tablet Take 1 Tab by mouth every eight (8) hours as needed for Nausea or Vomiting. Patient not taking: Reported on 2022   Jules Sanchez MD   Brompheniram-PSE-Chlophedianol (ATUSS DA) 2-30-12.5 mg/5 mL liqd Take 10 mL by mouth every eight (8) hours as needed for Cough.  Do NOT eat or drink for 5 minutes after taking. Patient not taking: Reported on 5/18/2022 10/14/19   Shahnaz Meeks MD   cyclobenzaprine (FLEXERIL) 5 mg tablet Take 1 Tab by mouth two (2) times daily as needed for Muscle Spasm(s). Patient not taking: Reported on 5/18/2022 5/20/19   Juan Cornejo MD   norgestimate-ethinyl estradiol ([facility] [place] [id number],) 0.25-35 mg-mcg per tablet Take 1 Tab by mouth daily. Patient not taking: Reported on 5/18/2022 5/19/14   Ida Ureña NP        Review of Systems: Pertinent items are noted in HPI. Objective:     Vitals:  Vitals:    05/18/22 1741 05/18/22 1745 05/18/22 1746 05/18/22 1804   BP:    123/81   Pulse:    (!) 101   Resp:       Temp:       SpO2: 99% 93% 98%    Weight:       Height:            Physical Exam:  General NAD  Abdm: gravid NT  JEROME LE NT w/o edema  Membranes:  Intact  SVE: 6/100/0  [place]: q 3-5 min  Fetal Heart Rate: Reactive cat 1  EFW: 7 lb by leopold      Prenatal Labs:   Lab Results   Component Value Date/Time    Rubella, External Immune 01/19/2022 12:00 AM    GrBStrep, External Negative 04/22/2022 12:00 AM    HBsAg, External Negative 01/19/2022 12:00 AM    HIV, External Negative 01/19/2022 12:00 AM    RPR, External Non-reactive 01/19/2022 12:00 AM    Gonorrhea, External Negative 12/17/2021 12:00 AM    Chlamydia, External Negative 12/17/2021 12:00 AM        Assessment/Plan:  IUP @  40w1d , labor     Plan:   1. Admit for Reassuring fetal status, Continue plan for vaginal delivery. 2. Group B Strep was negative. 3. Labs are pending.     Signed By:  Neelam Cortes DO     May 18, 2022

## 2022-05-19 LAB
ABO + RH BLD: NORMAL
BASOPHILS # BLD: 0 K/UL (ref 0–0.1)
BASOPHILS NFR BLD: 0 % (ref 0–1)
BLASTS NFR BLD MANUAL: 0 %
BLOOD GROUP ANTIBODIES SERPL: NORMAL
DIFFERENTIAL METHOD BLD: ABNORMAL
EOSINOPHIL # BLD: 0 K/UL (ref 0–0.4)
EOSINOPHIL NFR BLD: 0 % (ref 0–7)
ERYTHROCYTE [DISTWIDTH] IN BLOOD BY AUTOMATED COUNT: 13.5 % (ref 11.5–14.5)
HCT VFR BLD AUTO: 33.2 % (ref 35–47)
HGB BLD-MCNC: 11 G/DL (ref 11.5–16)
IMM GRANULOCYTES # BLD AUTO: 0 K/UL
IMM GRANULOCYTES NFR BLD AUTO: 0 %
LYMPHOCYTES # BLD: 2.5 K/UL (ref 0.8–3.5)
LYMPHOCYTES NFR BLD: 15 % (ref 12–49)
MCH RBC QN AUTO: 32.1 PG (ref 26–34)
MCHC RBC AUTO-ENTMCNC: 33.1 G/DL (ref 30–36.5)
MCV RBC AUTO: 96.8 FL (ref 80–99)
METAMYELOCYTES NFR BLD MANUAL: 0 %
MONOCYTES # BLD: 1.7 K/UL (ref 0–1)
MONOCYTES NFR BLD: 10 % (ref 5–13)
MYELOCYTES NFR BLD MANUAL: 1 %
NEUTS BAND NFR BLD MANUAL: 0 % (ref 0–6)
NEUTS SEG # BLD: 12.4 K/UL (ref 1.8–8)
NEUTS SEG NFR BLD: 74 % (ref 32–75)
NRBC # BLD: 0 K/UL (ref 0–0.01)
NRBC BLD-RTO: 0 PER 100 WBC
OTHER CELLS NFR BLD MANUAL: 0 %
PLATELET # BLD AUTO: 177 K/UL (ref 150–400)
PMV BLD AUTO: 12.6 FL (ref 8.9–12.9)
PROMYELOCYTES NFR BLD MANUAL: 0 %
RBC # BLD AUTO: 3.43 M/UL (ref 3.8–5.2)
RBC MORPH BLD: ABNORMAL
SPECIMEN EXP DATE BLD: NORMAL
WBC # BLD AUTO: 16.7 K/UL (ref 3.6–11)

## 2022-05-19 PROCEDURE — 2709999900 HC NON-CHARGEABLE SUPPLY

## 2022-05-19 PROCEDURE — 36415 COLL VENOUS BLD VENIPUNCTURE: CPT

## 2022-05-19 PROCEDURE — 10D17Z9 MANUAL EXTRACTION OF PRODUCTS OF CONCEPTION, RETAINED, VIA NATURAL OR ARTIFICIAL OPENING: ICD-10-PCS | Performed by: OBSTETRICS & GYNECOLOGY

## 2022-05-19 PROCEDURE — 85027 COMPLETE CBC AUTOMATED: CPT

## 2022-05-19 PROCEDURE — 0KQM0ZZ REPAIR PERINEUM MUSCLE, OPEN APPROACH: ICD-10-PCS | Performed by: OBSTETRICS & GYNECOLOGY

## 2022-05-19 PROCEDURE — 65270000029 HC RM PRIVATE

## 2022-05-19 PROCEDURE — 74011250637 HC RX REV CODE- 250/637: Performed by: OBSTETRICS & GYNECOLOGY

## 2022-05-19 RX ORDER — POLYETHYLENE GLYCOL 3350 17 G/17G
17 POWDER, FOR SOLUTION ORAL DAILY
Status: DISCONTINUED | OUTPATIENT
Start: 2022-05-19 | End: 2022-05-20 | Stop reason: HOSPADM

## 2022-05-19 RX ADMIN — IBUPROFEN 800 MG: 800 TABLET, FILM COATED ORAL at 10:50

## 2022-05-19 RX ADMIN — POLYETHYLENE GLYCOL 3350 17 G: 17 POWDER, FOR SOLUTION ORAL at 10:50

## 2022-05-19 RX ADMIN — IBUPROFEN 800 MG: 800 TABLET, FILM COATED ORAL at 01:15

## 2022-05-19 RX ADMIN — ACETAMINOPHEN 650 MG: 325 TABLET ORAL at 20:28

## 2022-05-19 RX ADMIN — IBUPROFEN 800 MG: 800 TABLET, FILM COATED ORAL at 17:21

## 2022-05-19 NOTE — PROGRESS NOTES
PostPartum Note    Verenice Arceo  [id number]  1990  27 y.o.    S:  Ms. Verenice Arceo is a 27 y.o.  PPD #1 s/p TSVD @ 40w1d. Doing well. She had a baby girl. Her lochia is like a period. She describes her pain as mild and is well controlled with PO medications. She is breast and bottle feeding and this is going well. She is ambulating and voiding. Tolerating PO intake. O:   Visit Vitals  /86 (BP 1 Location: Left arm, BP Patient Position: At rest)   Pulse (!) 107   Temp 97.9 °F (36.6 °C)   Resp 16   Ht 5' 7\" (1.702 m)   Wt 68 kg (150 lb)   SpO2 97%   Breastfeeding Unknown   BMI 23.49 kg/m²       Lab Results   Component Value Date/Time    WBC 11.4 (H) 2022 05:34 PM    HGB 12.1 2022 05:34 PM    HCT 35.7 2022 05:34 PM    PLATELET 456  05:34 PM    MCV 93.9 2022 05:34 PM       Gen - No acute distress  Abdomen - Fundus firm, below the umbilicus   Ext - Warm, well perfused. Nontender    A/P:  PPD #1 s/p TSVD @ 40w1d doing well. 1.  Routine PP instructions/ care discussed  2. Blood type - Rh pos  3. Rubella imm  4. Circumcision n/a   5. Discharge home tomorrow   6. F/U 4-6 weeks for PP check.       Merlyn He MD  [facility]

## 2022-05-19 NOTE — PROGRESS NOTES
1910: This RN at the bedside to introduce self to patient, patient requesting help disconnecting from the monitor to use the restroom. As she lowered to the toilet, she felt a pop and lots of fluid audibly fell into the toilet at 1915. Patient states \"I think my water broke\". 1918: RN returned to nitrazine test, nitrazine is positive. Patient wishes to stay on the toilet for a little while. 1920: Verbal shift change report given to [facility] RN (oncoming nurse) by Nicole Ornelas RN (offgoing nurse). Report included the following information SBAR and MAR. MD called and informed that patient spontaneously ruptured on the toilet. 1930: Linens changed, pads and underwear provided, hot packs made. Patient wishes to stay on the toilet just a little while longer, states it helps with her pain. 1945: Patient ambulated back from bathroom, opted to lie in the bed. EFM applied    2015: RN at the bedside to adjust EFM, additional hot packs given    2118: RN at the bedside, new hot packs provided. Patient feeling more constant pressure with contractions, requesting to be checked. Dr. Lurdes Weeks made aware    : RN at the bedside to check on patient, still feeling very uncomfortable    2224: RN and Mireya SHAH at the bedside    31 75 62: Patient is complete at +1    2231: Patient actively pushing. RN remains in continuous attendance at the bedside. Assessment & evaluation of fetal heart rate ongoing via continuous EFM. Patient pushing on her right lateral side    2244: RN remained at bedside throughout pushing. EFM continuously assessed. Vaginal delivery of viable female infant. 2307: Placenta delivered intact    2315: First fundal assessment to begin recovery. RN intermittently at the bedside over the next two hours to assess for further bleeding    0115: Two hour recovery concluded. Patient up to the bathroom at this time, ambulating without difficulty. Patient voiding independently.  Kimberlee care provided, dermaplast applied, icepack and witch hazel pads applied. Upon standing up from sitting on the toilet, patient reported feeling dizzy. Patient pivoted from bathroom to the wheelchair. 0150: TRANSFER - OUT REPORT:    Verbal report given to Trena Hernandez RN (name) on Keyla Segura  being transferred to MIU (unit) for routine progression of care       Report consisted of patients Situation, Background, Assessment and   Recommendations(SBAR). Information from the following report(s) SBAR, Intake/Output and MAR was reviewed with the receiving nurse. Lines:   Peripheral IV 05/18/22 Right Forearm (Active)   Site Assessment Clean, dry, & intact 05/19/22 0157   Phlebitis Assessment 0 05/19/22 0157   Infiltration Assessment 0 05/19/22 0157   Dressing Status Clean, dry, & intact 05/19/22 0157   Dressing Type Tape;Transparent 05/19/22 0157   Hub Color/Line Status Pink; Infusing 05/19/22 0157   Alcohol Cap Used Yes 05/19/22 0157        Opportunity for questions and clarification was provided.       Patient transported with:   Registered Nurse

## 2022-05-19 NOTE — DISCHARGE SUMMARY
Obstetrical Discharge Summary     Name: Yunior Steele MRN: [id number]  SSN: [id number]    YOB: 1990  Age: 27 y.o. Sex: female      Admit Date: 5/18/2022    Discharge Date: 5/20/2022    Attending Physician:  Alfonzo Esteves DO     Delivering Physician:  Sujata Adams MD     * Admission Diagnoses:   IUP @ 40w1d      * Discharge Diagnoses:   Delivery of a VFI via TSVD by Ranjan Justice MD on 5/19/2022. Apgars were 7 and 9.      2nd degree laceration      Additional Diagnoses:   Hospital Problems as of 5/19/2022 Date Reviewed: 10/14/2019          Codes Class Noted - Resolved POA    Pregnant ICD-10-CM: Z34.90  ICD-9-CM: V22.2  5/18/2022 - Present Unknown             Lab Results   Component Value Date/Time    Rubella, External Immune 01/19/2022 12:00 AM    GrBStrep, External Negative 04/22/2022 12:00 AM      There is no immunization history for the selected administration types on file for this patient. * Procedures:   TSVD         * Discharge Condition: good    [facility] Course: Normal hospital course following the delivery. * Disposition: Home    Discharge Medications:   Current Discharge Medication List          * Follow-up Care/Patient Instructions:   Activity: Activity as tolerated  Diet: Regular Diet  Wound Care: As directed      Followup 6 weeks for PP check        Signed By:  Janet Avelar MD     May 19, 2022

## 2022-05-19 NOTE — L&D DELIVERY NOTE
Delivery Summary    Patient: Yaquelin Alexander MRN: [id number]  SSN: [id number]    YOB: 1990  Age: 27 y.o. Sex: female        [facility] a term [facility] with apgars 7/9. The infant was placed on the mother's abdomen and clamping of the umbilical cord was delayed for 1 minute. The umbilical cord was then doubly clamped and was cut by the FOB. Pitocin bolus was initiated and cord blood was collected. Manual expression of the placenta was attempted but ut was noted to be densely adhered to the uterine wall. The patient had a second degree perineal laceration that infiltrated with 1% Lidocaine and repaired with 3-0 Vicryl. Another attempt was made to manually expressed and the placenta was still noted to be densely adhered. The placenta was manually extracted with mos of the placenta coming out with the first extraction. Two small cotyledons were removed with the second manual exploration of the uterine cavity. Another manual sweep of the uterine cavity was performed and no placental tissue was noted. Pitocin bolus was restarted. The patient was having persistent vaginal bleeding. Uterine massage was performed and the uterus was firm at umbilicus -1. Vaginal exam was performed with several small clots evacuated from the patient's cervix. Transabdominal US was performed and no retained POC were visualized. Persistent vaginal bleeding was noted and uterine massage was continued. Methergine 0.2 mg IM and TXA 1 gm iv was administered. The patient's vaginal bleeding improved and was eventually noted to be scant. Once the first bag of pitocin was finished a second bag of pitocin was started at 125 mL/hr.  mL.     Information for the patient's :  Rod Gonzalez [[id number]]       Labor Events:    Labor: No    Steroids: None   Cervical Ripening Date/Time:       Cervical Ripening Type: None   Antibiotics During Labor: No   Rupture Identifier: Sac 1    Rupture Date/Time: 2022 7:15 PM   Rupture Type: SROM   Amniotic Fluid Volume: Moderate    Amniotic Fluid Description: Clear    Amniotic Fluid Odor: None    Induction: None       Induction Date/Time:        Indications for Induction:      Augmentation: None   Augmentation Date/Time:      Indications for Augmentation:     Labor complications: None       Additional complications:        Delivery Events:  Indications For Episiotomy:     Episiotomy: None   Perineal Laceration(s): 2nd   Repaired: Yes   Periurethral Laceration Location:      Repaired:     Labial Laceration Location:     Repaired:     Sulcal Laceration Location:     Repaired:     Vaginal Laceration Location:     Repaired:     Cervical Laceration Location:     Repaired:     Repair Suture: Vicryl 3-0   Number of Repair Packets: 2   Estimated Blood Loss (ml):  ml   Quantitative Blood Loss (ml)                Delivery Date: 2022    Delivery Time: 10:44 PM  Delivery Type: Vaginal, Spontaneous  Sex:  Female    Gestational Age: 44w3d   Delivery Clinician:  Linnea Rodriguez  Living Status: Living   Delivery Location: L&D            APGARS  One minute Five minutes Ten minutes   Skin color: 1   1        Heart rate: 2   2        Grimace: 2   2        Muscle tone: 1   2        Breathin   2        Totals: 7   9            Presentation: Vertex    Position: Left Occiput Anterior  Resuscitation Method:  Suctioning-bulb;Suctioning-deep; Tactile Stimulation     Meconium Stained: None      Cord Information: 3 Vessels  Complications: None  Cord around:    Delayed cord clamping? Yes  Cord clamped date/time:2022 10:45 PM  Disposition of Cord Blood: Lab    Blood Gases Sent?: No    Placenta:  Date/Time: 2022 11:07 PM  Removal: Expressed      Appearance: Normal;Intact     [place] Measurements:  Birth Weight:        Birth Length:        Head Circumference:        Chest Circumference:       Abdominal Girth:       Other Providers: Eufemia SEXTON;KINSEY SILVA;GUICHO REED;Iza PRATT, Obstetrician;Primary Nurse;Primary [place] Nurse;Charge Nurse           Group B Strep:   Lab Results   Component Value Date/Time    GrBStrep, External Negative 2022 12:00 AM     Information for the patient's :  Kiki Corrales [[id number]]   No results found for: ABORH, PCTABR, PCTDIG, BILI, ABORHEXT, ABORH     No results for input(s): PCO2CB, PO2CB, HCO3I, SO2I, IBD, PTEMPI, SPECTI, PHICB, ISITE, IDEV, IALLEN in the last 72 hours.

## 2022-05-19 NOTE — LACTATION NOTE
This note was copied from a baby's chart. This is mother's first baby. Mother states baby last breast fed at [id number] for 10 minutes. LC reviewed timing of feedings, early and late feeding cues, what to expect re: baby's output, feed on demand, skin to skin, latch/positions , nutrition and hand expression. Discussed with mother her plan for feeding. Reviewed the benefits of exclusive breast milk feeding during the hospital stay. Informed her of the risks of using formula to supplement in the first few days of life as well as the benefits of successful breast milk feeding; referred her to the Breastfeeding booklet about this information. She acknowledges understanding of information reviewed and states that it is her plan to breast/bottle feed her infant. Will support her choice and offer additional information as needed. Hand Expression Education:  Mom taught how to manually hand express her colostrum. Emphasized the importance of providing infant with valuable colostrum as infant rests skin to skin at breast.  Aware to avoid extended periods of non-feeding. Aware to offer 10-20+ drops of colostrum every 2-3 hours until infant is latching and nursing effectively. Taught the rationale behind this low tech but highly effective evidence based practice. Encouraged mom to attempt feeding with baby led feeding cues. Just as sucking on fingers, rooting, mouthing. Looking for 8-12 feedings in 24 hours. Don't limit baby at breast, allow baby to come of breast on it's own. Baby may want to feed  often and may increase number of feedings on second day of life. Skin to skin encouraged. If baby doesn't nurse,  Mom should  hand express  10-20 drops of colostrum and drip into baby's mouth, or give to baby by finger feeding, cup feeding, or spoon feeding at least every 2-3 hours. Discussed eating a healthy diet. Instructed mother to eat a variety of foods in order to get a well balanced diet.  She should consume an extra 500 calories per day (more than her non-pregnant requirement.) These extra calories will help provide energy needed for optimal breast milk production. Mother also encouraged to \"drink to thirst\" and it is recommended that she drink fluids such as water, fruit/vegetable juice. Nutritious snacks should be available so that she can eat throughout the day to help satisfy her hunger and maintain a good milk supply. Mother will successfully establish breastfeeding by feeding in response to early feeding cues   or wake every 3h, will obtain deep latch, and will keep log of feedings/output. Taught to BF at hunger cues and or q 2-3 hrs and to offer 10-20 drops of hand expressed colostrum at any non-feeds. Breast Assessment  Left Breast: Medium  Left Nipple: Everted,Intact  Right Breast: Medium  Right Nipple: Everted,Intact  Breast- Feeding Assessment  Attends Breast-Feeding Classes: No  Breast-Feeding Experience: No  Breast Trauma/Surgery: No  Type/Quality: Good  Lactation Consultant Visits  Breast-Feedings: Good  (Mother last breast fed baby at 12 for 10 minutes)  Mother/Infant Observation  Infant Observation: Richard checked    Mother given breastfeeding handouts, LC# and encouraged her to call [facility] for breastfeeding assistance.

## 2022-05-19 NOTE — DISCHARGE INSTRUCTIONS
Discharge Instructions for Vaginal Delivery    Patient ID:  Kaylee Chan  [id number]  12 y.o.  1990    Take Home Medications       Continue taking your prenatal vitamins if you are breastfeeding. Follow-up care is a key part of your treatment and safety. Please schedule and keep appointments. Follow-up with your primary OB in 6 weeks. Activity  Avoid anything in your vagina for 6 weeks (no intercourse, tampons, or douching). You may drive unless you are taking prescription pain medications. Climbing stairs and light lifting are okay. Please avoid excessive exercise, though walking is okay- you'll be tired! Diet  Regular diet as tolerated. Be sure to drink plenty of fluids if you are breastfeeding. Wound care  If you have stitches, continue to rinse with a squirt bottle of warm water each time you void for about 7-10 days. .  Your stitches will gradually dissolve over four to eight weeks. Sitz baths are also helpful to keep the wound clean, encourage healing, and to help with pain associated with the stitches or hemorrhoids. You can use either a sitz bath basin or a bathtub filled with 2-3\" inches of plain warm water. Soak for 10 minutes 3 times a day as tolerated. Pain Management  1. Over the counter medications such as Tylenol and ibuprofen (Motrin or Advil) are ideal.  These may be taken together, alternating doses. You may  take the maximum dose:  Motrin or Advil (generic ibuprofen), either 3 tablets every 6 hours or 4 tablets every 8 hours or Tylenol (acetominophen) 1000mg every 6 hours (equivalent to 2 extra strength Tylenol). 2. You may also have a precrescription for stronger pain medication. Take only as needed and transition to over the counter medication in the next few days. Minimize amounts of the prescription medication, as it can be habit-forming and will worsen or cause constipation.  Most patients will find that within a couple of days, their pain is adequately controlled using only over-the-counter medications. 3. The prescription pain medication is mixed with Tylenol, therefore, you should not take any extra Tylenol or acetaminophen until you have reduced your prescription pain medication. 4. Add heating pad or sitz baths as needed. Add hemorrhoid wipes or ointments if needed    Constipation  1. Constipation is normal after pregnancy and delivery, especially while taking prescription narcotic pain medication. 2. Over the counter remedies including ducosate (Colace), take 1-2 capsules 1-2 times daily for soft stool as needed. You may also add/ try milk of magnesia or rectal remedies such as Dulcolax or Fleets enema. Recovery: What to Expect at Home  1. Fatigue is expected. Try to rest when you can and don't worry about doing housework or other tasks which can wait. 2. The soreness along your bottom will improve significantly over the first 2 weeks, but it may take 6 weeks before you are completely recovered. 3. Back pain or general body aches or muscle soreness are expected and should improve with acetominophen or ibuprofen. 4. Leg swelling due to pregnancy and/or IV fluids given in the hospital will take about two weeks to resolve. 5. Most women experience some form of the \"Baby Blues\" after having a baby. Feeling emotional, tearful, frustrated, anxious, sad, and irritable some of the time is normal and go away after about 2 weeks. Adequate rest and help from your family will help. Take breaks from caring for the baby. Call your doctor if your symptoms seem severe, last more than 2 weeks, or seem to be getting worse instead of better. Get help immediately if you have thoughts of wanting to hurt yourself or others! Call your doctor or seek immediate medical care if you have:  Heavy vaginal bleeding, soaking through one or more pads an hour for several hours. Foul-smelling discharge from your vagina or incision.   Consistent nausea and vomiting and cannot keep fluids down. Consistent pain that does not get better after you take pain medicine.   Sudden chest pain and shortness of breath  Signs of a blood clot: pain/ swelling/ increasing redness in your lower extremeties  Signs of infection: increased pain in your abdomen or vaginal area; red streaks, warmth, or tenderness of your breasts; fever of 100.5 F or greater

## 2022-05-19 NOTE — ROUTINE PROCESS
SBAR IN Report: Mother    Verbal report received from Mikey , RN (full name & credentials) on this patient, who is now being transferred from L&D (unit) for routine progression of care. Report consisted of patient's Situation, Background, Assessment and Recommendations (SBAR).  ID bands were compared with the identification form, and verified with the patient and transferring nurse. Information from the SBAR, Kardex, Procedure Summary, Intake/Output, MAR and Recent Results and the [place] Report was reviewed with the transferring nurse; opportunity for questions and clarification provided.

## 2022-05-19 NOTE — ROUTINE PROCESS
Bedside and Verbal shift change report given to ALONDRA Rothman RN (oncoming nurse) by AYLEEN Robbins RN (offgoing nurse). Report included the following information SBAR, Kardex, Intake/Output, MAR and Recent Results.

## 2022-05-20 VITALS
BODY MASS INDEX: 23.54 KG/M2 | HEART RATE: 98 BPM | DIASTOLIC BLOOD PRESSURE: 75 MMHG | HEIGHT: 67 IN | OXYGEN SATURATION: 97 % | TEMPERATURE: 98.7 F | WEIGHT: 150 LBS | RESPIRATION RATE: 16 BRPM | SYSTOLIC BLOOD PRESSURE: 113 MMHG

## 2022-05-20 PROCEDURE — 74011250637 HC RX REV CODE- 250/637: Performed by: OBSTETRICS & GYNECOLOGY

## 2022-05-20 RX ADMIN — IBUPROFEN 800 MG: 800 TABLET, FILM COATED ORAL at 03:11

## 2022-05-20 RX ADMIN — IBUPROFEN 800 MG: 800 TABLET, FILM COATED ORAL at 11:34

## 2022-05-20 RX ADMIN — POLYETHYLENE GLYCOL 3350 17 G: 17 POWDER, FOR SOLUTION ORAL at 11:34

## 2022-05-20 NOTE — ROUTINE PROCESS
Patient declines TDAP shot, states she will get it at her postpartum visit. Information provided to patient.

## 2022-05-20 NOTE — PROGRESS NOTES
PostPartum Note    Verenice Arceo  [id number]  1990  27 y.o.    S:  Ms. Verenice Arceo is a 27 y.o.  PPD #2 s/p TSVD @ 40w1d. Doing well. She had a baby girl. Her lochia is like a period. She describes her pain as mild and is well controlled with PO medications. She is breast and bottle feeding and this is going well. She is ambulating and voiding. Tolerating PO intake. Has hemmrhoids     O:   Visit Vitals  /67 (BP 1 Location: Left arm, BP Patient Position: At rest)   Pulse 99   Temp 97.7 °F (36.5 °C)   Resp 16   Ht 5' 7\" (1.702 m)   Wt 68 kg (150 lb)   SpO2 97%   Breastfeeding Unknown   BMI 23.49 kg/m²       Gen - No acute distress  Respirations - nonlabored   Abdomen - Fundus firm below the umbilicus   Ext - Warm, well perfused, nontender     Lab Results   Component Value Date/Time    WBC 16.7 (H) 2022 01:44 PM    HGB 11.0 (L) 2022 01:44 PM    HCT 33.2 (L) 2022 01:44 PM    PLATELET [id number] 01:44 PM    MCV 96.8 2022 01:44 PM         A/P:  PPD #2 s/p TSVD @ 40w1d doing well. 1.  Routine PP instructions/ care discussed  2. Blood type - Rh pos  3. Rubella imm  4.  transient elevated bps during labor, not persistent no toxic sxs  5. Discharge home today   6. F/U 4-6 weeks for PP check.       Falguni Garner MD  [facility]

## 2022-05-20 NOTE — ROUTINE PROCESS
Bedside and Verbal shift change report given to YASMINE Jones RN (oncoming nurse) by AYLEEN Bethea RN (offgoing nurse). Report included the following information SBAR, Kardex, Intake/Output, MAR and Recent Results.

## 2022-08-03 NOTE — TELEPHONE ENCOUNTER
Called to inform pt of results while Dr. Murray Lieron out of the office. Transvaginal and pelvic US were normal. Pt now reporting sharp pain in her pelvic area once a month prior to her periods (this appears to be different from what she reported at her last visit). Advised her that if this is the case she may be experiencing [place] pain related to ovulation. Advised that she can take Tylenol or up to 800mg Motrin every 8 hours for this pain. All questions answered and pt is in agreement with the treatment plan.
Please advise.
Pt is anxious about her results. Since she has not heard anything she thinks something is wrong. Please check the lab results and have Pt contacted.  Thankls
Pt was seen on 03/21 and is calling for her test results again. Pt states she thought surely she would have heard back something since the 21st. Second call.
  Rekha DENTON [facility] Front Office Pool                     Pt is calling to get Test Result, please call pt at [phone]
16-Mar-2021